# Patient Record
Sex: FEMALE | Race: WHITE | Employment: FULL TIME | URBAN - METROPOLITAN AREA
[De-identification: names, ages, dates, MRNs, and addresses within clinical notes are randomized per-mention and may not be internally consistent; named-entity substitution may affect disease eponyms.]

---

## 2017-04-17 ENCOUNTER — OFFICE VISIT (OUTPATIENT)
Dept: FAMILY MEDICINE CLINIC | Facility: CLINIC | Age: 52
End: 2017-04-17

## 2017-04-17 VITALS
TEMPERATURE: 97 F | DIASTOLIC BLOOD PRESSURE: 68 MMHG | WEIGHT: 131 LBS | BODY MASS INDEX: 22.09 KG/M2 | SYSTOLIC BLOOD PRESSURE: 106 MMHG | HEART RATE: 76 BPM | HEIGHT: 64.5 IN | RESPIRATION RATE: 16 BRPM

## 2017-04-17 DIAGNOSIS — G47.00 INSOMNIA, UNSPECIFIED TYPE: ICD-10-CM

## 2017-04-17 DIAGNOSIS — Z00.00 ROUTINE PHYSICAL EXAMINATION: Primary | ICD-10-CM

## 2017-04-17 DIAGNOSIS — Z12.39 SCREENING FOR BREAST CANCER: ICD-10-CM

## 2017-04-17 DIAGNOSIS — Z01.419 ENCOUNTER FOR GYNECOLOGICAL EXAMINATION WITHOUT ABNORMAL FINDING: ICD-10-CM

## 2017-04-17 PROCEDURE — 99396 PREV VISIT EST AGE 40-64: CPT | Performed by: FAMILY MEDICINE

## 2017-04-17 RX ORDER — CLONAZEPAM 0.5 MG/1
TABLET ORAL
Qty: 30 TABLET | Refills: 1 | Status: SHIPPED
Start: 2017-04-17 | End: 2018-02-05

## 2017-04-17 RX ORDER — VALACYCLOVIR HYDROCHLORIDE 500 MG/1
500 TABLET, FILM COATED ORAL 2 TIMES DAILY
Qty: 30 TABLET | Refills: 2 | Status: SHIPPED | OUTPATIENT
Start: 2017-04-17 | End: 2018-07-17

## 2017-04-17 NOTE — PROGRESS NOTES
HPI: Connor Moore is a 47year old female who presents for routine physical.  Has not been seen here since 2014. Was seen at [facility] due to insurance change. States depression is stable. Feels anxiety is worse than depression.     Uses Clonazepam as ne Negative for eye discharge and vision loss  Gastrointestinal:  Negative for abdominal pain, constipation, decreased appetite, diarrhea and vomiting  Genitourinary:  Negative for dysuria and hematuria  Hema/Lymph:  Negative for easy bleeding and easy bruisi call with results. Insomnia, unspecified type    May be secondary to anxiety.      Derrick Christina, DO

## 2017-04-22 ENCOUNTER — APPOINTMENT (OUTPATIENT)
Dept: LAB | Age: 52
End: 2017-04-22
Attending: FAMILY MEDICINE
Payer: COMMERCIAL

## 2017-04-22 DIAGNOSIS — Z00.00 ROUTINE PHYSICAL EXAMINATION: ICD-10-CM

## 2017-04-22 PROCEDURE — 80061 LIPID PANEL: CPT

## 2017-04-22 PROCEDURE — 82306 VITAMIN D 25 HYDROXY: CPT

## 2017-04-22 PROCEDURE — 80053 COMPREHEN METABOLIC PANEL: CPT

## 2017-04-22 PROCEDURE — 84443 ASSAY THYROID STIM HORMONE: CPT

## 2017-04-22 PROCEDURE — 83036 HEMOGLOBIN GLYCOSYLATED A1C: CPT

## 2017-04-22 PROCEDURE — 36415 COLL VENOUS BLD VENIPUNCTURE: CPT

## 2017-04-22 PROCEDURE — 85027 COMPLETE CBC AUTOMATED: CPT

## 2017-05-08 ENCOUNTER — TELEPHONE (OUTPATIENT)
Dept: FAMILY MEDICINE CLINIC | Facility: CLINIC | Age: 52
End: 2017-05-08

## 2017-05-08 RX ORDER — FLUCONAZOLE 150 MG/1
150 TABLET ORAL ONCE
Qty: 2 TABLET | Refills: 0 | Status: SHIPPED | OUTPATIENT
Start: 2017-05-08 | End: 2017-05-08

## 2017-05-08 NOTE — TELEPHONE ENCOUNTER
Notified pt prescription sent to pharmacy as requested and advised pt to call back if no improvement in symptoms, pt agreed with plan of care and had no further questions at this time.

## 2017-05-08 NOTE — TELEPHONE ENCOUNTER
Per pt, she is having possible yeast infection started about a day ago Sx: itchy, redness and a bit of a discharge, per pt she has a long history of it and would like to know if  can send rx med Diflucan to her pharmacy on file.

## 2017-05-08 NOTE — TELEPHONE ENCOUNTER
Actions Requested: pt thinks she has a yeast infection, asking for prescription for Diflucan  Situation/Background   Problem: vaginal itching redness, white vaginal discharge   Onset: yesterday   Associated Symptoms: vaginal itching, irritation, redness

## 2017-08-17 ENCOUNTER — HOSPITAL ENCOUNTER (OUTPATIENT)
Dept: MAMMOGRAPHY | Facility: HOSPITAL | Age: 52
Discharge: HOME | End: 2017-08-17
Attending: FAMILY MEDICINE
Payer: COMMERCIAL

## 2017-08-17 DIAGNOSIS — Z12.39 SCREENING FOR BREAST CANCER: ICD-10-CM

## 2017-09-20 ENCOUNTER — HOSPITAL ENCOUNTER (OUTPATIENT)
Dept: MAMMOGRAPHY | Facility: HOSPITAL | Age: 52
Discharge: HOME | End: 2017-09-20
Attending: FAMILY MEDICINE
Payer: COMMERCIAL

## 2017-09-20 PROCEDURE — 77067 SCR MAMMO BI INCL CAD: CPT | Performed by: FAMILY MEDICINE

## 2017-10-21 ENCOUNTER — TELEPHONE (OUTPATIENT)
Dept: OTHER | Age: 52
End: 2017-10-21

## 2017-10-21 NOTE — TELEPHONE ENCOUNTER
Pt calling to set up a appt to see Dr.Weiler as she has been having more hot flashes making her anxiety worse. Pt stated that she has been having hot flashes for about two years on/off but feels that they are coming on more and more.  She also has been havi

## 2017-10-25 ENCOUNTER — OFFICE VISIT (OUTPATIENT)
Dept: FAMILY MEDICINE CLINIC | Facility: CLINIC | Age: 52
End: 2017-10-25

## 2017-10-25 VITALS
SYSTOLIC BLOOD PRESSURE: 111 MMHG | BODY MASS INDEX: 22 KG/M2 | TEMPERATURE: 98 F | WEIGHT: 132 LBS | HEART RATE: 67 BPM | DIASTOLIC BLOOD PRESSURE: 71 MMHG | RESPIRATION RATE: 16 BRPM

## 2017-10-25 DIAGNOSIS — R23.2 HOT FLASHES: Primary | ICD-10-CM

## 2017-10-25 DIAGNOSIS — F41.9 ANXIETY: ICD-10-CM

## 2017-10-25 DIAGNOSIS — M21.619 BUNION OF GREAT TOE: ICD-10-CM

## 2017-10-25 PROCEDURE — 99213 OFFICE O/P EST LOW 20 MIN: CPT | Performed by: FAMILY MEDICINE

## 2017-10-25 PROCEDURE — 99212 OFFICE O/P EST SF 10 MIN: CPT | Performed by: FAMILY MEDICINE

## 2017-10-25 NOTE — PROGRESS NOTES
HPI: Iglesia Camarena is a 47year old female who presents for menopausal symptoms. Hot flashes have come back in the past few weeks which have triggered anxiety. Has a history of depression and anxiety. Has not been on SSRI in greater than 10 years.   Has had therap right now but will monitor closely. Bunion of great toe    Refer to podiatry. I spent approximately 15 minutes with this patient face to face, >50% was in counseling.         Asa Betancur, DO

## 2017-11-08 ENCOUNTER — NURSE TRIAGE (OUTPATIENT)
Dept: OTHER | Age: 52
End: 2017-11-08

## 2017-11-08 ENCOUNTER — APPOINTMENT (OUTPATIENT)
Dept: GENERAL RADIOLOGY | Facility: HOSPITAL | Age: 52
End: 2017-11-08
Payer: COMMERCIAL

## 2017-11-08 ENCOUNTER — HOSPITAL ENCOUNTER (OUTPATIENT)
Facility: HOSPITAL | Age: 52
Setting detail: OBSERVATION
Discharge: HOME | End: 2017-11-09
Admitting: HOSPITALIST
Payer: COMMERCIAL

## 2017-11-08 DIAGNOSIS — R07.9 ACUTE CHEST PAIN: Primary | ICD-10-CM

## 2017-11-08 PROBLEM — E87.1 HYPONATREMIA: Status: ACTIVE | Noted: 2017-11-08

## 2017-11-08 PROCEDURE — 71020 XR CHEST PA + LAT CHEST (CPT=71020): CPT

## 2017-11-08 PROCEDURE — 99219 INITIAL OBSERVATION CARE,LEVL II: CPT | Performed by: HOSPITALIST

## 2017-11-08 RX ORDER — METOPROLOL TARTRATE 50 MG/1
50 TABLET, FILM COATED ORAL ONCE AS NEEDED
Status: DISCONTINUED | OUTPATIENT
Start: 2017-11-08 | End: 2017-11-08

## 2017-11-08 RX ORDER — ALPRAZOLAM 0.25 MG/1
0.25 TABLET ORAL
Status: DISCONTINUED | OUTPATIENT
Start: 2017-11-09 | End: 2017-11-09

## 2017-11-08 RX ORDER — ALPRAZOLAM 0.5 MG/1
0.25 TABLET ORAL
Status: DISCONTINUED | OUTPATIENT
Start: 2017-11-08 | End: 2017-11-08

## 2017-11-08 RX ORDER — NITROGLYCERIN 0.4 MG/1
0.4 TABLET SUBLINGUAL ONCE
Status: DISCONTINUED | OUTPATIENT
Start: 2017-11-09 | End: 2017-11-09

## 2017-11-08 RX ORDER — METOPROLOL TARTRATE 5 MG/5ML
5 INJECTION INTRAVENOUS SEE ADMIN INSTRUCTIONS
Status: DISCONTINUED | OUTPATIENT
Start: 2017-11-08 | End: 2017-11-08

## 2017-11-08 RX ORDER — ONDANSETRON 2 MG/ML
4 INJECTION INTRAMUSCULAR; INTRAVENOUS EVERY 6 HOURS PRN
Status: DISCONTINUED | OUTPATIENT
Start: 2017-11-08 | End: 2017-11-09

## 2017-11-08 RX ORDER — DILTIAZEM HYDROCHLORIDE 5 MG/ML
10 INJECTION INTRAVENOUS SEE ADMIN INSTRUCTIONS
Status: DISCONTINUED | OUTPATIENT
Start: 2017-11-08 | End: 2017-11-08

## 2017-11-08 RX ORDER — ACETAMINOPHEN 325 MG/1
650 TABLET ORAL EVERY 6 HOURS PRN
Status: DISCONTINUED | OUTPATIENT
Start: 2017-11-08 | End: 2017-11-09

## 2017-11-08 RX ORDER — METOPROLOL TARTRATE 50 MG/1
50 TABLET, FILM COATED ORAL ONCE AS NEEDED
Status: COMPLETED | OUTPATIENT
Start: 2017-11-09 | End: 2017-11-09

## 2017-11-08 RX ORDER — ZOLPIDEM TARTRATE 5 MG/1
5 TABLET ORAL NIGHTLY PRN
Status: DISCONTINUED | OUTPATIENT
Start: 2017-11-08 | End: 2017-11-09

## 2017-11-08 RX ORDER — ASPIRIN 325 MG
325 TABLET ORAL DAILY
Status: DISCONTINUED | OUTPATIENT
Start: 2017-11-09 | End: 2017-11-09

## 2017-11-08 RX ORDER — METOPROLOL TARTRATE 50 MG/1
100 TABLET, FILM COATED ORAL ONCE AS NEEDED
Status: DISCONTINUED | OUTPATIENT
Start: 2017-11-08 | End: 2017-11-08

## 2017-11-08 RX ORDER — METOPROLOL TARTRATE 50 MG/1
50 TABLET, FILM COATED ORAL ONCE
Status: DISCONTINUED | OUTPATIENT
Start: 2017-11-08 | End: 2017-11-09

## 2017-11-08 RX ORDER — METOPROLOL TARTRATE 100 MG/1
100 TABLET ORAL ONCE AS NEEDED
Status: DISCONTINUED | OUTPATIENT
Start: 2017-11-09 | End: 2017-11-09

## 2017-11-08 RX ORDER — CHROMIUM 200 MCG
1 TABLET ORAL DAILY
Status: DISCONTINUED | OUTPATIENT
Start: 2017-11-09 | End: 2017-11-08 | Stop reason: RX

## 2017-11-08 RX ORDER — METOPROLOL TARTRATE 100 MG/1
100 TABLET ORAL ONCE
Status: COMPLETED | OUTPATIENT
Start: 2017-11-08 | End: 2017-11-08

## 2017-11-08 RX ORDER — CLONAZEPAM 0.5 MG/1
0.5 TABLET ORAL NIGHTLY PRN
Status: DISCONTINUED | OUTPATIENT
Start: 2017-11-08 | End: 2017-11-09

## 2017-11-08 RX ORDER — NITROGLYCERIN 0.4 MG/1
0.4 TABLET SUBLINGUAL EVERY 5 MIN PRN
Status: DISCONTINUED | OUTPATIENT
Start: 2017-11-08 | End: 2017-11-09

## 2017-11-08 RX ORDER — METOPROLOL TARTRATE 5 MG/5ML
5 INJECTION INTRAVENOUS SEE ADMIN INSTRUCTIONS
Status: DISCONTINUED | OUTPATIENT
Start: 2017-11-09 | End: 2017-11-09

## 2017-11-08 RX ORDER — SODIUM CHLORIDE 0.9 % (FLUSH) 0.9 %
3 SYRINGE (ML) INJECTION AS NEEDED
Status: DISCONTINUED | OUTPATIENT
Start: 2017-11-08 | End: 2017-11-09

## 2017-11-08 RX ORDER — ASPIRIN 81 MG/1
324 TABLET, CHEWABLE ORAL ONCE
Status: COMPLETED | OUTPATIENT
Start: 2017-11-08 | End: 2017-11-08

## 2017-11-08 RX ORDER — OMEGA-3 FATTY ACIDS/FISH OIL 300-1000MG
1 CAPSULE ORAL DAILY
Status: DISCONTINUED | OUTPATIENT
Start: 2017-11-09 | End: 2017-11-08 | Stop reason: RX

## 2017-11-08 RX ORDER — DILTIAZEM HYDROCHLORIDE 5 MG/ML
10 INJECTION INTRAVENOUS SEE ADMIN INSTRUCTIONS
Status: DISCONTINUED | OUTPATIENT
Start: 2017-11-09 | End: 2017-11-09

## 2017-11-08 NOTE — TELEPHONE ENCOUNTER
Action Requested: Summary for Provider     []  Critical Lab, Recommendations Needed  [] Need Additional Advice  []   FYI    []   Need Orders  [] Need Medications Sent to Pharmacy  []  Other     SUMMARY: Pt was advised to go immediately to ER, she declined

## 2017-11-08 NOTE — ED PROVIDER NOTES
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     Physical Exam   ED Triage Vitals [11/08/17 1335]  BP: 134/92  Pulse: 69  Resp: 20  Temp: 98.8 °F (37.1 °C)  Temp src: Temporal  SpO2: 100 %  O2 Device: None (Room air)    Current:/79   Pulse 85   Temp 98.8 °F (3 RAINBOW DRAW LAVENDER TALL (BNP)     EKG    Rate, intervals and axes as noted on EKG Report. Rate: 65  Rhythm: Sinus Rhythm  Reading: Patient's EKG demonstrates a sinus rhythm with a rate of 65.   Patient's axis and intervals are normal.  There is no acu

## 2017-11-08 NOTE — H&P
[facility]    PATIENT'S NAME: Suraj Yolis OBRIEN   ATTENDING PHYSICIAN: Brent Lujan MD   PATIENT ACCOUNT#:   [id number]    LOCATION:  [place] [address]  MEDICAL RECORD #:   [id number]       YOB: 1965  ADMISSION DATE:       11/08/201 pressure 146/104, pulse ox 99% on room air. HEENT:  Atraumatic. Oropharynx clear with moist mucous membranes. Normal hard and soft palate. NECK:  Trachea midline. Full range of motion. Supple. No thyromegaly or lymphadenopathy.   LUNGS:  Clear to ausc

## 2017-11-08 NOTE — ED INITIAL ASSESSMENT (HPI)
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 \" No distress.  Breathing normal.

## 2017-11-09 ENCOUNTER — APPOINTMENT (OUTPATIENT)
Dept: CT IMAGING | Facility: HOSPITAL | Age: 52
End: 2017-11-09
Attending: HOSPITALIST
Payer: COMMERCIAL

## 2017-11-09 VITALS
RESPIRATION RATE: 20 BRPM | OXYGEN SATURATION: 100 % | HEART RATE: 73 BPM | DIASTOLIC BLOOD PRESSURE: 64 MMHG | HEIGHT: 65 IN | BODY MASS INDEX: 22.36 KG/M2 | WEIGHT: 134.19 LBS | SYSTOLIC BLOOD PRESSURE: 101 MMHG | TEMPERATURE: 99 F

## 2017-11-09 PROCEDURE — 75574 CT ANGIO HRT W/3D IMAGE: CPT | Performed by: HOSPITALIST

## 2017-11-09 PROCEDURE — 99217 OBSERVATION CARE DISCHARGE: CPT | Performed by: HOSPITALIST

## 2017-11-09 PROCEDURE — 99291 CRITICAL CARE FIRST HOUR: CPT | Performed by: HOSPITALIST

## 2017-11-09 RX ORDER — 0.9 % SODIUM CHLORIDE 0.9 %
VIAL (ML) INJECTION
Status: DISCONTINUED
Start: 2017-11-09 | End: 2017-11-09

## 2017-11-09 RX ORDER — SODIUM CHLORIDE 9 MG/ML
INJECTION, SOLUTION INTRAVENOUS
Status: COMPLETED
Start: 2017-11-09 | End: 2017-11-09

## 2017-11-09 NOTE — PLAN OF CARE
Patient/Family Goals    • Patient/Family Long Term Goal Adequate for Discharge    • Patient/Family Short Term Goal Adequate for Discharge        CTA NORMAL.  D/C PATIENT HOME

## 2017-11-09 NOTE — PROGRESS NOTES
CT angiogram report to follow:  LAD 1 major diag both are normal  LCx 3 Om normal  RCA dominant and normal along with PDA. PLV is too small to characterize.

## 2017-11-09 NOTE — SIGNIFICANT EVENT
RRT called to room 505    Patient lethargic, slightly confused claims she cannot see anything feeling extremely weak. As per RN currently being managed for CT angiogram coronaries received beta-blocker now heart rate in 20s with blood pressure of 60/20.

## 2017-11-09 NOTE — SIGNIFICANT EVENT
RRT    *See RRT Documentation Record*    Reason the RRT was called:  bradycardic 20's   Assessment of patient leading up to RRT: Rn was with patient on this episode, she was c/o of nausea , sweating then loss her  Consciousness.  As per tele, patients heart

## 2017-11-09 NOTE — TELEPHONE ENCOUNTER
Pt was admitted to [address] [place] [address] for hyponatremia and chest pain. Closing encounter per protocol.

## 2017-11-09 NOTE — IMAGING NOTE
TO CT VIA CART AT 1030  HX TAKEN PT CONSENTED TODAY VS HR 54 BP 91/59    18 GAUGE IV STARTED ON UNIT    RECEIVED METOPROLOL IN 2 DOSES LAST NIGHT AND 0700 TODAY.     TO CT TABLE O2 PLACED AT 2 L NASAL CANNULA HOOKED TO MONITOR      NITROGLYCERIN 0.4 MILLIGR

## 2017-11-09 NOTE — DISCHARGE SUMMARY
[facility]    Discharge Summary    Jeaneth Pyle Patient Status:  Observation    1965 MRN [id number]   Location [address] [facility] Attending Bhargav Chaudhry MD   Hosp Day # 0 PCP Emelia Runner, DO     Date of Admission: 20 not exerting herself. Came in to the emergency department for evaluation. CBC, chemistry, troponin, chest x-ray, and EKG were all unremarkable. The patient will be admitted to hospital for further management and observation.     Hospital Course:   -Chest

## 2017-11-09 NOTE — PROGRESS NOTES
Chromium tablets and Omega-3 capsules have been discontinued while patient is in the hospital due to availability. Medications can be resumed on discharge if deemed necessary.     David Jaimes, 11/08/17, 7:44 PM

## 2017-11-10 ENCOUNTER — TELEPHONE (OUTPATIENT)
Dept: INTERNAL MEDICINE UNIT | Facility: HOSPITAL | Age: 52
End: 2017-11-10

## 2018-02-05 RX ORDER — CLONAZEPAM 0.5 MG/1
TABLET ORAL
Qty: 30 TABLET | Refills: 0 | OUTPATIENT
Start: 2018-02-05 | End: 2018-02-07

## 2018-02-06 NOTE — TELEPHONE ENCOUNTER
LOV: 10-25-17 Last Rx: 4-17-17    Please advise in regards to refill request. Thank You    Please respond to pool: RICHARD FM OPO LPN/CMA

## 2018-02-09 RX ORDER — CLONAZEPAM 0.5 MG/1
TABLET ORAL
Qty: 30 TABLET | Refills: 0 | Status: SHIPPED | OUTPATIENT
Start: 2018-02-09 | End: 2018-02-14

## 2018-02-14 ENCOUNTER — TELEPHONE (OUTPATIENT)
Dept: INTERNAL MEDICINE UNIT | Facility: HOSPITAL | Age: 53
End: 2018-02-14

## 2018-02-15 RX ORDER — CLONAZEPAM 0.5 MG/1
TABLET ORAL
Qty: 30 TABLET | Refills: 0 | Status: SHIPPED | OUTPATIENT
Start: 2018-02-15 | End: 2018-03-14 | Stop reason: ALTCHOICE

## 2018-02-19 NOTE — TELEPHONE ENCOUNTER
Pt is calling state that she has been waiting sense the 2/7 pt want to know if prescription can be phone in today pleae

## 2018-03-12 ENCOUNTER — TELEPHONE (OUTPATIENT)
Dept: FAMILY MEDICINE CLINIC | Facility: CLINIC | Age: 53
End: 2018-03-12

## 2018-03-14 ENCOUNTER — TELEPHONE (OUTPATIENT)
Dept: OTHER | Age: 53
End: 2018-03-14

## 2018-03-14 NOTE — PROGRESS NOTES
HPI: Cristal Fabian is a 47year old female who presents for increasing problems with anxiety and depression. Started seeing a therapist. Therapist is recommending medication. Had been on SSRI for about 10 years but has been off for well over 10 years.  No suicidal disorder     Will start Wellbutrin as pt tolerated it well in the past. Discussed possible side effects. Follow-up one month. Bunion of great toe of left foot    Refer to Podiatry. Pharyngitis, unspecified etiology    Rapid strep negative.   Symptomati

## 2018-03-15 NOTE — TELEPHONE ENCOUNTER
Spoke with Bridget from [facility] pharmacy-calling to clarify Clonazepam Rx sent today-she states, \"There's a big black line going through to fax. \". Phoned in Clonazepam refill to [address] at [facility] as ordered By CMW today.

## 2018-03-29 ENCOUNTER — PATIENT MESSAGE (OUTPATIENT)
Dept: FAMILY MEDICINE CLINIC | Facility: CLINIC | Age: 53
End: 2018-03-29

## 2018-03-29 RX ORDER — ESTRADIOL 10 UG/1
10 INSERT VAGINAL
Qty: 8 TABLET | Refills: 2 | Status: SHIPPED | OUTPATIENT
Start: 2018-03-29 | End: 2018-04-28

## 2018-04-02 NOTE — TELEPHONE ENCOUNTER
From: Marino Riley DO  To: Stephanie Pisano  Sent: 3/29/2018 5:55 PM CDT  Subject: medication    HI Karlos Lash-   While trying to clean my desk, I came across your name and realized I never sent in the cream for vaginal dryness.  It's called Vagifem and you c

## 2018-04-24 NOTE — TELEPHONE ENCOUNTER
Please advise on refill request.     Refill Protocol Appointment Criteria  · Appointment scheduled in the past 6 months or in the next 3 months  Recent Outpatient Visits            1 month ago Moderate episode of recurrent major depressive disorder ([facility] [address]

## 2018-04-25 RX ORDER — CLONAZEPAM 0.5 MG/1
TABLET ORAL
Qty: 30 TABLET | Refills: 0 | OUTPATIENT
Start: 2018-04-25 | End: 2018-04-25

## 2018-04-30 RX ORDER — CLONAZEPAM 0.5 MG/1
TABLET ORAL
Qty: 30 TABLET | Refills: 1 | Status: SHIPPED
Start: 2018-04-30 | End: 2018-10-02

## 2018-05-01 ENCOUNTER — OFFICE VISIT (OUTPATIENT)
Dept: PODIATRY CLINIC | Facility: CLINIC | Age: 53
End: 2018-05-01

## 2018-05-01 ENCOUNTER — HOSPITAL ENCOUNTER (OUTPATIENT)
Dept: GENERAL RADIOLOGY | Facility: HOSPITAL | Age: 53
Discharge: HOME | End: 2018-05-01
Attending: PODIATRIST
Payer: COMMERCIAL

## 2018-05-01 DIAGNOSIS — M21.612 BILATERAL BUNIONS: Primary | ICD-10-CM

## 2018-05-01 DIAGNOSIS — M21.611 BILATERAL BUNIONS: Primary | ICD-10-CM

## 2018-05-01 DIAGNOSIS — M79.671 PAIN IN BOTH FEET: ICD-10-CM

## 2018-05-01 DIAGNOSIS — M79.672 PAIN IN BOTH FEET: ICD-10-CM

## 2018-05-01 DIAGNOSIS — M21.612 BILATERAL BUNIONS: ICD-10-CM

## 2018-05-01 DIAGNOSIS — M21.611 BILATERAL BUNIONS: ICD-10-CM

## 2018-05-01 PROCEDURE — 73630 X-RAY EXAM OF FOOT: CPT | Performed by: PODIATRIST

## 2018-05-01 PROCEDURE — 99212 OFFICE O/P EST SF 10 MIN: CPT | Performed by: PODIATRIST

## 2018-05-01 PROCEDURE — 99243 OFF/OP CNSLTJ NEW/EST LOW 30: CPT | Performed by: PODIATRIST

## 2018-07-18 RX ORDER — VALACYCLOVIR HYDROCHLORIDE 500 MG/1
TABLET, FILM COATED ORAL
Qty: 30 TABLET | Refills: 1 | Status: SHIPPED | OUTPATIENT
Start: 2018-07-18 | End: 2018-08-12

## 2018-08-12 NOTE — TELEPHONE ENCOUNTER
LOV: 3-14-18 Last Rx: 7-18-18     No protocol     Please advise in regards to refill request. Thank You

## 2018-08-13 RX ORDER — VALACYCLOVIR HYDROCHLORIDE 500 MG/1
TABLET, FILM COATED ORAL
Qty: 30 TABLET | Refills: 0 | Status: SHIPPED | OUTPATIENT
Start: 2018-08-13 | End: 2018-12-20

## 2018-08-27 ENCOUNTER — TELEPHONE (OUTPATIENT)
Dept: FAMILY MEDICINE CLINIC | Facility: CLINIC | Age: 53
End: 2018-08-27

## 2018-08-27 DIAGNOSIS — Z12.39 SCREENING FOR BREAST CANCER: Primary | ICD-10-CM

## 2018-08-27 DIAGNOSIS — R92.2 DENSE BREAST TISSUE ON MAMMOGRAM: ICD-10-CM

## 2018-08-27 NOTE — TELEPHONE ENCOUNTER
Pt called in requesting to have a [facility] diagnostic mammogram order placed on to her chart.   Please advise

## 2018-08-28 NOTE — TELEPHONE ENCOUNTER
Dr Jamshid Coburn order pending. Last px 4/17/17, last mammo last September.  Here are the recommendations: RECOMMENDATIONS:   ROUTINE SCREENING MAMMOGRAM TO PERHAPS INCLUDE BREAST  TOMOSYNTHESIS  AND CLINICAL EVALUATION

## 2018-10-02 RX ORDER — CLONAZEPAM 0.5 MG/1
TABLET ORAL
Qty: 30 TABLET | Refills: 0 | Status: SHIPPED
Start: 2018-10-02 | End: 2018-12-04

## 2018-10-02 NOTE — TELEPHONE ENCOUNTER
Pt is requesting a refill on :    Per pt she needs a new script. She states she is leaving out of town.      CLONAZEPAM 0.5 MG Oral Tab TAKE 1 TABLET BY MOUTH AT BEDTIME Disp: 30 tablet Rfl: 1

## 2018-10-30 ENCOUNTER — HOSPITAL ENCOUNTER (OUTPATIENT)
Dept: MAMMOGRAPHY | Facility: HOSPITAL | Age: 53
Discharge: HOME | End: 2018-10-30
Attending: FAMILY MEDICINE
Payer: COMMERCIAL

## 2018-10-30 DIAGNOSIS — Z12.39 SCREENING FOR BREAST CANCER: ICD-10-CM

## 2018-10-30 DIAGNOSIS — R92.2 DENSE BREAST TISSUE ON MAMMOGRAM: ICD-10-CM

## 2018-10-30 PROCEDURE — 77067 SCR MAMMO BI INCL CAD: CPT | Performed by: FAMILY MEDICINE

## 2018-10-30 PROCEDURE — 77063 BREAST TOMOSYNTHESIS BI: CPT | Performed by: FAMILY MEDICINE

## 2018-12-04 NOTE — TELEPHONE ENCOUNTER
Review pended refill request as it does not fall under a protocol.     Last Rx: 10/2/18  LOV: 03/14/18  Refill Protocol Appointment Criteria  · Appointment scheduled in the past 6 months or in the next 3 months  Recent Outpatient Visits            7 months

## 2018-12-05 RX ORDER — VALACYCLOVIR HYDROCHLORIDE 500 MG/1
TABLET, FILM COATED ORAL
Qty: 30 TABLET | Refills: 0 | Status: SHIPPED | OUTPATIENT
Start: 2018-12-05 | End: 2018-12-20

## 2018-12-05 NOTE — TELEPHONE ENCOUNTER
No Protocol on this med.        Requested Prescriptions     Pending Prescriptions Disp Refills   • VALACYCLOVIR  MG Oral Tab [Pharmacy Med Name: ValACYclovir HCl Oral Tablet 500 MG] 30 tablet 0     Sig: TAKE ONE TABLET BY MOUTH TWICE DAILY FOR 3 DAYS

## 2018-12-06 RX ORDER — CLONAZEPAM 0.5 MG/1
TABLET ORAL
Qty: 30 TABLET | Refills: 1 | Status: SHIPPED
Start: 2018-12-06 | End: 2018-12-07

## 2018-12-08 NOTE — TELEPHONE ENCOUNTER
Requested Prescriptions     Pending Prescriptions Disp Refills   • CLONAZEPAM 0.5 MG Oral Tab [Pharmacy Med Name: ClonazePAM Oral Tablet 0.5 MG] 30 tablet 0     Sig: TAKE ONE TABLET BY MOUTH AT BEDTIME       Last Office Visit with PCP: Visit date not found

## 2018-12-10 ENCOUNTER — TELEPHONE (OUTPATIENT)
Dept: FAMILY MEDICINE CLINIC | Facility: CLINIC | Age: 53
End: 2018-12-10

## 2018-12-10 RX ORDER — CLONAZEPAM 0.5 MG/1
TABLET ORAL
Qty: 30 TABLET | Refills: 0 | Status: SHIPPED
Start: 2018-12-10 | End: 2019-01-31

## 2018-12-10 NOTE — TELEPHONE ENCOUNTER
Pt called in to f/u on medication refill request for the following medication. Pt stated she contacted pharmacy and it ha not been sent yet. Pharmacy has been verified as [facility].   Please advise pt       Current Outpatient Medications:

## 2018-12-20 ENCOUNTER — OFFICE VISIT (OUTPATIENT)
Dept: FAMILY MEDICINE CLINIC | Facility: CLINIC | Age: 53
End: 2018-12-20
Payer: COMMERCIAL

## 2018-12-20 VITALS
TEMPERATURE: 98 F | DIASTOLIC BLOOD PRESSURE: 77 MMHG | BODY MASS INDEX: 22 KG/M2 | RESPIRATION RATE: 16 BRPM | HEART RATE: 71 BPM | SYSTOLIC BLOOD PRESSURE: 114 MMHG | WEIGHT: 132 LBS

## 2018-12-20 DIAGNOSIS — N94.10 PAIN IN FEMALE GENITALIA ON INTERCOURSE: ICD-10-CM

## 2018-12-20 DIAGNOSIS — J01.10 ACUTE NON-RECURRENT FRONTAL SINUSITIS: ICD-10-CM

## 2018-12-20 DIAGNOSIS — F32.A DEPRESSION, UNSPECIFIED DEPRESSION TYPE: Primary | ICD-10-CM

## 2018-12-20 PROCEDURE — 99212 OFFICE O/P EST SF 10 MIN: CPT | Performed by: FAMILY MEDICINE

## 2018-12-20 PROCEDURE — 99214 OFFICE O/P EST MOD 30 MIN: CPT | Performed by: FAMILY MEDICINE

## 2018-12-20 RX ORDER — BUPROPION HYDROCHLORIDE 150 MG/1
150 TABLET ORAL DAILY
Qty: 90 TABLET | Refills: 1 | Status: SHIPPED | OUTPATIENT
Start: 2018-12-20 | End: 2019-06-01

## 2018-12-20 RX ORDER — VALACYCLOVIR HYDROCHLORIDE 500 MG/1
TABLET, FILM COATED ORAL
Qty: 30 TABLET | Refills: 6 | Status: SHIPPED | OUTPATIENT
Start: 2018-12-20 | End: 2019-05-03

## 2018-12-20 NOTE — PROGRESS NOTES
HPI: Silvia Wallace is a 47year old female who presents for multiple concerns. Pt would like to start Wellbutrin again. She always finds adjustment hard but she is on vacation so it's a good time to start.  Will be starting with new therapist on Jan 10th in [address] [place] [address] Neck supple. Good ROM. No LAD. CV:  Regular rate and rhythm; no murmurs  Lungs:  Clear to ausculation; good aeration               No wheezes, rales or rhonchi  Psych: Orientated to person, place, and time. Behavior and affect appropriate for age.

## 2019-01-05 ENCOUNTER — OFFICE VISIT (OUTPATIENT)
Dept: FAMILY MEDICINE CLINIC | Facility: CLINIC | Age: 54
End: 2019-01-05
Payer: COMMERCIAL

## 2019-01-05 VITALS
HEART RATE: 68 BPM | TEMPERATURE: 98 F | SYSTOLIC BLOOD PRESSURE: 120 MMHG | RESPIRATION RATE: 14 BRPM | DIASTOLIC BLOOD PRESSURE: 70 MMHG

## 2019-01-05 DIAGNOSIS — H00.024 HORDEOLUM INTERNUM LEFT UPPER EYELID: Primary | ICD-10-CM

## 2019-01-05 PROCEDURE — 99213 OFFICE O/P EST LOW 20 MIN: CPT | Performed by: NURSE PRACTITIONER

## 2019-01-05 RX ORDER — ERYTHROMYCIN 5 MG/G
OINTMENT OPHTHALMIC
Qty: 1 TUBE | Refills: 0 | Status: SHIPPED | OUTPATIENT
Start: 2019-01-05 | End: 2019-05-03

## 2019-01-05 NOTE — PROGRESS NOTES
CHIEF COMPLAINT:   Patient presents with:  Derm Problem: has swelling and pain in left upper eyelid, since yesterday. HPI:   Cheri Fernández is a 47year old female who presents with chief complaint of bump on left upper  eyelid.  Symptoms began 2 days a • Hypertension Other         per [employer]:  \"Family h/o Hypertension\"   • Cancer Maternal Aunt         per [employer]:  \"Cancer - breast, (cause of death)\"   • Breast Cancer Maternal Aunt 43        cause of death   • Cancer Paternal Grandmother         per Cornelio Diamond is a 47year old female who presents with:left upper eyelid stye, educated on using disposable warm not hot packs, eyelid hygiene and not wearing contact lenses until medication has been completed, disposing of eye make up if worn, and how to · Artificial tears may also be used to lubricate the eye and make it more comfortable. You can buy these over the counter without a prescription. Talk with your healthcare provider before using any over-the-counter treatment for a sty.   · Apply a warm, dam

## 2019-01-05 NOTE — PATIENT INSTRUCTIONS
Apply prescription ointment as directed  Apply warm, moist compress for 15 minutes throughout the day  Cleanse eyelids daily with a neutral soap (Bob's Baby Shampoo) in a 1:1 solution with clean water  If no improvement seek ophthalmologist      Torrey loza (38°C) or above, or as directed by your provider  · Vision changes  · Headache or stiff neck  · The sty comes back  Date Last Reviewed: 8/1/2017  © 6723-4487 [employer]. [address] [facility], [address] [place] [address]. All rights reserved.  This in

## 2019-02-01 NOTE — TELEPHONE ENCOUNTER
Controlled medication pending for review. If approved needs to be called in or faxed by on-site staff.     Last Rx: 12-10-18  LOV: 12-20-18

## 2019-02-02 RX ORDER — CLONAZEPAM 0.5 MG/1
0.5 TABLET ORAL NIGHTLY PRN
Qty: 30 TABLET | Refills: 0 | Status: SHIPPED
Start: 2019-02-02 | End: 2019-05-16

## 2019-05-03 ENCOUNTER — OFFICE VISIT (OUTPATIENT)
Dept: FAMILY MEDICINE CLINIC | Facility: CLINIC | Age: 54
End: 2019-05-03
Payer: COMMERCIAL

## 2019-05-03 ENCOUNTER — TELEPHONE (OUTPATIENT)
Dept: FAMILY MEDICINE CLINIC | Facility: CLINIC | Age: 54
End: 2019-05-03

## 2019-05-03 ENCOUNTER — HOSPITAL ENCOUNTER (OUTPATIENT)
Dept: CT IMAGING | Facility: HOSPITAL | Age: 54
Discharge: HOME | End: 2019-05-03
Attending: PHYSICIAN ASSISTANT
Payer: COMMERCIAL

## 2019-05-03 ENCOUNTER — NURSE TRIAGE (OUTPATIENT)
Dept: OTHER | Age: 54
End: 2019-05-03

## 2019-05-03 VITALS
TEMPERATURE: 98 F | SYSTOLIC BLOOD PRESSURE: 120 MMHG | WEIGHT: 130 LBS | DIASTOLIC BLOOD PRESSURE: 79 MMHG | HEIGHT: 65 IN | BODY MASS INDEX: 21.66 KG/M2 | HEART RATE: 76 BPM

## 2019-05-03 DIAGNOSIS — R10.32 LEFT LOWER QUADRANT PAIN: ICD-10-CM

## 2019-05-03 DIAGNOSIS — R10.32 ACUTE LEFT LOWER QUADRANT PAIN: ICD-10-CM

## 2019-05-03 DIAGNOSIS — R10.32 LEFT LOWER QUADRANT PAIN: Primary | ICD-10-CM

## 2019-05-03 DIAGNOSIS — R39.89 URINARY PROBLEM: ICD-10-CM

## 2019-05-03 DIAGNOSIS — R07.9 ACUTE CHEST PAIN: ICD-10-CM

## 2019-05-03 DIAGNOSIS — E87.1 HYPONATREMIA: ICD-10-CM

## 2019-05-03 PROCEDURE — 74177 CT ABD & PELVIS W/CONTRAST: CPT | Performed by: PHYSICIAN ASSISTANT

## 2019-05-03 PROCEDURE — 99213 OFFICE O/P EST LOW 20 MIN: CPT | Performed by: PHYSICIAN ASSISTANT

## 2019-05-03 PROCEDURE — 99212 OFFICE O/P EST SF 10 MIN: CPT | Performed by: PHYSICIAN ASSISTANT

## 2019-05-03 PROCEDURE — 82565 ASSAY OF CREATININE: CPT

## 2019-05-03 NOTE — TELEPHONE ENCOUNTER
Action Requested: Summary for Provider     []  Critical Lab, Recommendations Needed  [] Need Additional Advice  []   FYI    []   Need Orders  [] Need Medications Sent to Pharmacy  []  Other     SUMMARY: Patient requesting appt tody for intermittent lower a

## 2019-05-03 NOTE — TELEPHONE ENCOUNTER
CT  Abdomen + pelvis normal- per [facility]   Tired calling pt- no answer VL left   This is most likely a muscle pain, take advil or tylenol for pain -per [facility]

## 2019-05-03 NOTE — PROGRESS NOTES
HPI:     Abdominal Pain   This is a new problem. The current episode started 1 to 4 weeks ago. The problem occurs intermittently. The problem has been unchanged. The pain is located in the LLQ. The pain is at a severity of 6/10.  The abdominal pain does not per [employer]:  \"Family h/o Hypertension\"   • Cancer Maternal Aunt         per [employer]:  \"Cancer - breast, (cause of death)\"   • Breast Cancer Maternal Aunt 43        cause of death   • Cancer Paternal Grandmother         per [employer]:  \"Cancer - ov Blood Transfusions: Not Asked        Caffeine Concern: Yes          Coffee, 2 cups daily        Occupational Exposure: Not Asked        Hobby Hazards: Not Asked        Sleep Concern: Not Asked        Stress Concern: Not Asked        Weight Concern: N Right Ear: Tympanic membrane and ear canal normal. Tympanic membrane is not erythematous. No cerumen present  Left Ear: Tympanic membrane and ear canal normal. Tympanic membrane is not erythematous.  No cerumen present  Nose: Nose normal.   Eyes: Conjunctiv

## 2019-05-06 ENCOUNTER — TELEPHONE (OUTPATIENT)
Dept: FAMILY MEDICINE CLINIC | Facility: CLINIC | Age: 54
End: 2019-05-06

## 2019-05-06 NOTE — TELEPHONE ENCOUNTER
Pt spoke with a Triage and Pt feels Triage did no answer the questions she had.     Pt is request call back from Angela Kumar from PCP office    Pt said can call anytime

## 2019-05-07 DIAGNOSIS — R10.32 LLQ PAIN: Primary | ICD-10-CM

## 2019-05-16 ENCOUNTER — OFFICE VISIT (OUTPATIENT)
Dept: FAMILY MEDICINE CLINIC | Facility: CLINIC | Age: 54
End: 2019-05-16
Payer: COMMERCIAL

## 2019-05-16 ENCOUNTER — APPOINTMENT (OUTPATIENT)
Dept: LAB | Age: 54
End: 2019-05-16
Attending: FAMILY MEDICINE
Payer: COMMERCIAL

## 2019-05-16 VITALS
HEART RATE: 70 BPM | HEIGHT: 65 IN | SYSTOLIC BLOOD PRESSURE: 129 MMHG | RESPIRATION RATE: 16 BRPM | TEMPERATURE: 98 F | WEIGHT: 131 LBS | DIASTOLIC BLOOD PRESSURE: 78 MMHG | BODY MASS INDEX: 21.83 KG/M2

## 2019-05-16 DIAGNOSIS — Z00.00 ROUTINE PHYSICAL EXAMINATION: ICD-10-CM

## 2019-05-16 DIAGNOSIS — L71.9 ROSACEA: ICD-10-CM

## 2019-05-16 DIAGNOSIS — Z12.11 SCREENING FOR COLON CANCER: ICD-10-CM

## 2019-05-16 DIAGNOSIS — Z12.39 SCREENING FOR BREAST CANCER: ICD-10-CM

## 2019-05-16 DIAGNOSIS — Z00.00 ROUTINE PHYSICAL EXAMINATION: Primary | ICD-10-CM

## 2019-05-16 DIAGNOSIS — F32.A DEPRESSION, UNSPECIFIED DEPRESSION TYPE: ICD-10-CM

## 2019-05-16 PROCEDURE — 82306 VITAMIN D 25 HYDROXY: CPT

## 2019-05-16 PROCEDURE — 36415 COLL VENOUS BLD VENIPUNCTURE: CPT

## 2019-05-16 PROCEDURE — 85027 COMPLETE CBC AUTOMATED: CPT

## 2019-05-16 PROCEDURE — 80053 COMPREHEN METABOLIC PANEL: CPT

## 2019-05-16 PROCEDURE — 84443 ASSAY THYROID STIM HORMONE: CPT

## 2019-05-16 PROCEDURE — 80061 LIPID PANEL: CPT

## 2019-05-16 PROCEDURE — 99396 PREV VISIT EST AGE 40-64: CPT | Performed by: FAMILY MEDICINE

## 2019-05-16 NOTE — PROGRESS NOTES
HPI:  49 yr old female who presents for physical. Exercises 4-5 times per week. Going on 6 day bike ride. . Struggling with depression. Started Wellbutrin in the past. Sg he is not sure it made a difference.   Feels like depression goes up and do mucous membrane moist.  Normal lips, teeth, and gums. Oropharynx normal.  Neck supple. Good ROM. No LAD.   Thyroid normal.  CV:  Regular rate and rhythm; no murmurs  Lungs:  Clear to ausculation; good aeration               No wheezes, rales or rhonchi

## 2019-05-27 ENCOUNTER — PATIENT MESSAGE (OUTPATIENT)
Dept: OTHER | Age: 54
End: 2019-05-27

## 2019-06-01 RX ORDER — BUPROPION HYDROCHLORIDE 150 MG/1
150 TABLET ORAL DAILY
Qty: 90 TABLET | Refills: 1 | Status: SHIPPED | OUTPATIENT
Start: 2019-06-01 | End: 2019-10-09

## 2019-09-24 ENCOUNTER — OFFICE VISIT (OUTPATIENT)
Dept: FAMILY MEDICINE CLINIC | Facility: CLINIC | Age: 54
End: 2019-09-24
Payer: COMMERCIAL

## 2019-09-24 VITALS
DIASTOLIC BLOOD PRESSURE: 74 MMHG | SYSTOLIC BLOOD PRESSURE: 103 MMHG | HEART RATE: 83 BPM | HEIGHT: 65 IN | TEMPERATURE: 99 F | OXYGEN SATURATION: 96 % | RESPIRATION RATE: 16 BRPM | BODY MASS INDEX: 22.33 KG/M2 | WEIGHT: 134 LBS

## 2019-09-24 DIAGNOSIS — H69.81 DYSFUNCTION OF RIGHT EUSTACHIAN TUBE: Primary | ICD-10-CM

## 2019-09-24 PROCEDURE — 99213 OFFICE O/P EST LOW 20 MIN: CPT | Performed by: NURSE PRACTITIONER

## 2019-09-24 RX ORDER — METHYLPREDNISOLONE 4 MG/1
TABLET ORAL
Qty: 1 KIT | Refills: 0 | Status: SHIPPED | OUTPATIENT
Start: 2019-09-24 | End: 2019-10-24

## 2019-09-24 NOTE — PROGRESS NOTES
Maggy Lopez is a 49year old female who presents for  right ear fullness sensation. Problem has been occurring for  3  days. Symptoms have painful since onset. No sick contact. Denies ear discharge, swelling, or pain of the ear.   Denies fever, sinus con EYES:denies blurred or double vision any other cold symptoms, no stuffy nose, no tinnitus, + right ear decreased hearing  HEENT: see HPI  LUNGS: denies shortness of breath with exertion, no cough.   GI: no nausea   NEURO: no headaches, no dizziness, no TMJ Signed Prescriptions Disp Refills   • methylPREDNISolone (MEDROL) 4 MG Oral Tablet Therapy Pack 1 kit 0     Sig: As directed. Sudafed 120 mg by mouth daily for 3 days.           Patient Instructions     Earache, No Infection (Adult)  Earaches can happen w · You may use over-the-counter medicine as directed to control pain, unless another medicine was prescribed. If you have chronic liver or kidney disease or ever had a stomach ulcer or GI bleeding, talk with your doctor before using these medicines.  Aspirin

## 2019-10-18 ENCOUNTER — OFFICE VISIT (OUTPATIENT)
Dept: FAMILY MEDICINE CLINIC | Facility: CLINIC | Age: 54
End: 2019-10-18
Payer: COMMERCIAL

## 2019-10-18 VITALS — OXYGEN SATURATION: 100 %

## 2019-10-18 DIAGNOSIS — R30.0 DYSURIA: ICD-10-CM

## 2019-10-18 DIAGNOSIS — N30.91 CYSTITIS WITH HEMATURIA: Primary | ICD-10-CM

## 2019-10-18 PROCEDURE — 99213 OFFICE O/P EST LOW 20 MIN: CPT | Performed by: NURSE PRACTITIONER

## 2019-10-18 PROCEDURE — 81003 URINALYSIS AUTO W/O SCOPE: CPT | Performed by: NURSE PRACTITIONER

## 2019-10-18 RX ORDER — NITROFURANTOIN 25; 75 MG/1; MG/1
100 CAPSULE ORAL 2 TIMES DAILY
Qty: 10 CAPSULE | Refills: 0 | Status: SHIPPED | OUTPATIENT
Start: 2019-10-18 | End: 2019-10-23

## 2019-10-18 NOTE — PROGRESS NOTES
CHIEF COMPLAINT:   Patient presents with:  Urinary Symptoms (urologic): Urgency frequency and discomfort at the end of unrine stream.       HPI:   Jeaneth Pyle is a 49year old female who presents with symptoms of UTI.  Complaining of urinary frequency, ur BP (P) 122/80   Pulse (P) 77   Temp (P) 98.6 °F (37 °C) (Oral)   Resp (P) 18   Ht (P) 65\"   Wt (P) 132 lb (59.9 kg)   LMP 03/20/2017   SpO2 100%   BMI (P) 21.97 kg/m²   GENERAL: well developed, well nourished, and in no apparent distress  CARDIO: RRR, no Leticia Caro is a 49year old female presents with UTI symptoms. U/A: abnormal for Leukocytes: Small and Blood: Moderate. Pt does have back pain which she states is normal for her.  Educated pt that back pain can sometimes be a sign of a kidney infection, Urine is normally doesn't have any bacteria in it. But bacteria can get into the urinary tract from the skin around the rectum. Or they can travel in the blood from elsewhere in the body.  Once they are in your urinary tract, they can cause infection in the · Procedures such as having a catheter inserted  · Older age  · Not emptying your bladder. This can allow bacteria a chance to grow in your urine.   · Dehydration  · Constipation  · Sex  · Use of a diaphragm for birth control   Treatment  Bladder infections · Urinate right after intercourse to flush out your bladder. · If you use birth control pills and have frequent bladder infections, discuss it with your doctor.   Follow-up care  Call your healthcare provider if all symptoms are not gone after 3 days of tr

## 2019-10-22 ENCOUNTER — NURSE TRIAGE (OUTPATIENT)
Dept: FAMILY MEDICINE CLINIC | Facility: CLINIC | Age: 54
End: 2019-10-22

## 2019-10-22 NOTE — TELEPHONE ENCOUNTER
Action Requested: Summary for Provider     []  Critical Lab, Recommendations Needed  [] Need Additional Advice  [x]   FYI    []   Need Orders  [] Need Medications Sent to Pharmacy  []  Other     SUMMARY: Patient calling with complaint of lower back pain th

## 2019-10-24 ENCOUNTER — OFFICE VISIT (OUTPATIENT)
Dept: FAMILY MEDICINE CLINIC | Facility: CLINIC | Age: 54
End: 2019-10-24
Payer: COMMERCIAL

## 2019-10-24 VITALS
TEMPERATURE: 99 F | HEART RATE: 87 BPM | WEIGHT: 133 LBS | BODY MASS INDEX: 22 KG/M2 | RESPIRATION RATE: 16 BRPM | SYSTOLIC BLOOD PRESSURE: 100 MMHG | DIASTOLIC BLOOD PRESSURE: 64 MMHG

## 2019-10-24 DIAGNOSIS — G89.29 CHRONIC RIGHT-SIDED LOW BACK PAIN WITH RIGHT-SIDED SCIATICA: Primary | ICD-10-CM

## 2019-10-24 DIAGNOSIS — M54.41 CHRONIC RIGHT-SIDED LOW BACK PAIN WITH RIGHT-SIDED SCIATICA: Primary | ICD-10-CM

## 2019-10-24 PROCEDURE — 99213 OFFICE O/P EST LOW 20 MIN: CPT | Performed by: FAMILY MEDICINE

## 2019-10-24 NOTE — PROGRESS NOTES
HPI: Sofia Ornelas is a 49year old female who presents for low back pain. Pt state she has been diagnosed with herniated disc. No imaging taken. Woke up with pain. Saw chiropractor and did PT. Has been doing PT for about 2-3 months.   Pain improved some but now

## 2019-10-26 ENCOUNTER — HOSPITAL ENCOUNTER (OUTPATIENT)
Dept: GENERAL RADIOLOGY | Age: 54
Discharge: HOME | End: 2019-10-26
Attending: FAMILY MEDICINE
Payer: COMMERCIAL

## 2019-10-26 DIAGNOSIS — M54.41 CHRONIC RIGHT-SIDED LOW BACK PAIN WITH RIGHT-SIDED SCIATICA: ICD-10-CM

## 2019-10-26 DIAGNOSIS — G89.29 CHRONIC RIGHT-SIDED LOW BACK PAIN WITH RIGHT-SIDED SCIATICA: ICD-10-CM

## 2019-10-26 PROCEDURE — 72110 X-RAY EXAM L-2 SPINE 4/>VWS: CPT | Performed by: FAMILY MEDICINE

## 2019-11-02 ENCOUNTER — HOSPITAL ENCOUNTER (OUTPATIENT)
Dept: MAMMOGRAPHY | Facility: HOSPITAL | Age: 54
Discharge: HOME | End: 2019-11-02
Attending: FAMILY MEDICINE
Payer: COMMERCIAL

## 2019-11-02 DIAGNOSIS — Z12.39 SCREENING FOR BREAST CANCER: ICD-10-CM

## 2019-11-02 PROCEDURE — 77063 BREAST TOMOSYNTHESIS BI: CPT | Performed by: FAMILY MEDICINE

## 2019-11-02 PROCEDURE — 77067 SCR MAMMO BI INCL CAD: CPT | Performed by: FAMILY MEDICINE

## 2019-11-14 ENCOUNTER — OFFICE VISIT (OUTPATIENT)
Dept: PHYSICAL THERAPY | Age: 54
End: 2019-11-14
Attending: FAMILY MEDICINE
Payer: COMMERCIAL

## 2019-11-14 DIAGNOSIS — G89.29 CHRONIC RIGHT-SIDED LOW BACK PAIN WITH RIGHT-SIDED SCIATICA: ICD-10-CM

## 2019-11-14 DIAGNOSIS — M54.41 CHRONIC RIGHT-SIDED LOW BACK PAIN WITH RIGHT-SIDED SCIATICA: ICD-10-CM

## 2019-11-14 PROCEDURE — 97110 THERAPEUTIC EXERCISES: CPT | Performed by: PHYSICAL THERAPIST

## 2019-11-14 PROCEDURE — 97161 PT EVAL LOW COMPLEX 20 MIN: CPT | Performed by: PHYSICAL THERAPIST

## 2019-11-14 NOTE — PROGRESS NOTES
BACK EVALUATION:    Referring Physician: Yolande Graham    DX Code: Chronic right-sided low back pain with right-sided sciatica (M54.41,G89.29)     PT DX: Chronic right-sided low back pain with right-sided sciatica (M54.41,G89.29)    PCP: Indio Latham, counseled to maintain / resume normal activities.   Postural Education  (Please close note by pressing F9 and then reopen by clicking on 'Edit'  before going further)   ASSESSMENT & PLAN OF CARE:     Danii Ledezma is a 49year old female referred to  Pollen [place] joint and/or soft tissue mobility  · Therapeutic exercises  · Pt. education for posture, body mechanics, and ergonomics  · Mechanical Diagnosis and Treatment  · HEP instruction                                                             Pt. was advised reg

## 2019-11-19 ENCOUNTER — OFFICE VISIT (OUTPATIENT)
Dept: PHYSICAL THERAPY | Age: 54
End: 2019-11-19
Attending: FAMILY MEDICINE
Payer: COMMERCIAL

## 2019-11-19 PROCEDURE — 97110 THERAPEUTIC EXERCISES: CPT | Performed by: PHYSICAL THERAPIST

## 2019-11-19 PROCEDURE — 97140 MANUAL THERAPY 1/> REGIONS: CPT | Performed by: PHYSICAL THERAPIST

## 2019-11-19 NOTE — PROGRESS NOTES
Dx: Chronic right-sided low back pain with right-sided sciatica (M54.41,G89.29)         Authorized # of Visits:  8         Next MD visit: none scheduled  Fall Risk: standard         Precautions: n/a           Medication Changes since last visit?: No  Augusta Worthington

## 2019-11-23 RX ORDER — BUPROPION HYDROCHLORIDE 150 MG/1
TABLET ORAL
Qty: 90 TABLET | Refills: 0 | OUTPATIENT
Start: 2019-11-23

## 2019-11-23 NOTE — TELEPHONE ENCOUNTER
Fawn message sent. Dr Ashlyn Mitchell (psychiatrist-prescriber) who order the medication. RN=call pharmacy and patient about this.

## 2019-11-24 ENCOUNTER — OFFICE VISIT (OUTPATIENT)
Dept: FAMILY MEDICINE CLINIC | Facility: CLINIC | Age: 54
End: 2019-11-24
Payer: COMMERCIAL

## 2019-11-24 VITALS
HEART RATE: 87 BPM | TEMPERATURE: 99 F | RESPIRATION RATE: 14 BRPM | SYSTOLIC BLOOD PRESSURE: 118 MMHG | DIASTOLIC BLOOD PRESSURE: 74 MMHG

## 2019-11-24 DIAGNOSIS — H61.21 IMPACTED CERUMEN OF RIGHT EAR: Primary | ICD-10-CM

## 2019-11-24 PROCEDURE — 69210 REMOVE IMPACTED EAR WAX UNI: CPT | Performed by: NURSE PRACTITIONER

## 2019-11-24 NOTE — PROGRESS NOTES
CHIEF COMPLAINT:   Patient presents with:  Ear Pain      HPI:   Tory Gonzales is a 49year old female who presents to clinic today with reports of right ear discomfort that radiates to throat, she also admits to right ear fullness, these sx has been pres Arden Pimentel is a 49year old female who presents with \"clogged ears\"    ASSESSMENT:  Impacted cerumen of right ear  (primary encounter diagnosis)    PLAN:  Successful cerumen removal. Patient tolerated well without complications.   Advised to f/u w/ ENT for bertha

## 2019-11-26 ENCOUNTER — APPOINTMENT (OUTPATIENT)
Dept: PHYSICAL THERAPY | Age: 54
End: 2019-11-26
Attending: FAMILY MEDICINE
Payer: COMMERCIAL

## 2019-12-03 ENCOUNTER — OFFICE VISIT (OUTPATIENT)
Dept: PHYSICAL THERAPY | Age: 54
End: 2019-12-03
Attending: FAMILY MEDICINE
Payer: COMMERCIAL

## 2019-12-03 PROCEDURE — 97140 MANUAL THERAPY 1/> REGIONS: CPT | Performed by: PHYSICAL THERAPIST

## 2019-12-03 PROCEDURE — 97110 THERAPEUTIC EXERCISES: CPT | Performed by: PHYSICAL THERAPIST

## 2019-12-03 NOTE — PROGRESS NOTES
Dx: Chronic right-sided low back pain with right-sided sciatica (M54.41,G89.29)         Authorized # of Visits:  8         Next MD visit: none scheduled  Fall Risk: standard         Precautions: n/a           Medication Changes since last visit?: No  [employer] activities  · Pt. will be able to perform ADLs with no pain. · Pt will be able to perform work related tasks with no pain. · Pt. will be independent with home exercise program and self management.     Plan: pt to cont current HEP, self flex/rot mob prn t

## 2019-12-04 ENCOUNTER — TELEPHONE (OUTPATIENT)
Dept: FAMILY MEDICINE CLINIC | Facility: CLINIC | Age: 54
End: 2019-12-04

## 2019-12-04 RX ORDER — BUPROPION HYDROCHLORIDE 150 MG/1
150 TABLET ORAL EVERY MORNING
Qty: 30 TABLET | Refills: 0 | Status: SHIPPED | OUTPATIENT
Start: 2019-12-04 | End: 2020-01-01

## 2019-12-04 NOTE — TELEPHONE ENCOUNTER
Patient is calling once again about a refill request on her Bupropion. Patient states that she is not seeing the psychiatrist anymore, it was only supposed to be for a short while, and was instructed by psychiatrist to receive medication from PCP.     Thank

## 2019-12-05 ENCOUNTER — APPOINTMENT (OUTPATIENT)
Dept: PHYSICAL THERAPY | Age: 54
End: 2019-12-05
Attending: FAMILY MEDICINE
Payer: COMMERCIAL

## 2019-12-10 ENCOUNTER — APPOINTMENT (OUTPATIENT)
Dept: PHYSICAL THERAPY | Age: 54
End: 2019-12-10
Attending: FAMILY MEDICINE
Payer: COMMERCIAL

## 2019-12-12 ENCOUNTER — APPOINTMENT (OUTPATIENT)
Dept: PHYSICAL THERAPY | Age: 54
End: 2019-12-12
Attending: FAMILY MEDICINE
Payer: COMMERCIAL

## 2019-12-17 ENCOUNTER — APPOINTMENT (OUTPATIENT)
Dept: PHYSICAL THERAPY | Age: 54
End: 2019-12-17
Attending: FAMILY MEDICINE
Payer: COMMERCIAL

## 2019-12-19 ENCOUNTER — APPOINTMENT (OUTPATIENT)
Dept: PHYSICAL THERAPY | Age: 54
End: 2019-12-19
Attending: FAMILY MEDICINE
Payer: COMMERCIAL

## 2020-01-01 RX ORDER — BUPROPION HYDROCHLORIDE 150 MG/1
150 TABLET ORAL EVERY MORNING
Qty: 30 TABLET | Refills: 0 | Status: SHIPPED | OUTPATIENT
Start: 2020-01-01 | End: 2020-02-07

## 2020-01-01 NOTE — TELEPHONE ENCOUNTER
Refill Protocol Appointment Criteria. REFILLED PER PROTOCOL.     · Appointment scheduled in the past 6 months or in the next 3 months  Recent Outpatient Visits            4 weeks ago     [address] Arredondo in [place], [place], DPT,

## 2020-02-07 RX ORDER — BUPROPION HYDROCHLORIDE 150 MG/1
TABLET ORAL
Qty: 90 TABLET | Refills: 1 | Status: SHIPPED | OUTPATIENT
Start: 2020-02-07 | End: 2020-08-06

## 2020-02-07 NOTE — TELEPHONE ENCOUNTER
Review pended refill request as it does not fall under a protocol.     Last Rx: 1/1/20  #30#0  LOV: 3 months ago

## 2020-03-04 ENCOUNTER — OFFICE VISIT (OUTPATIENT)
Dept: FAMILY MEDICINE CLINIC | Facility: CLINIC | Age: 55
End: 2020-03-04
Payer: COMMERCIAL

## 2020-03-04 VITALS
BODY MASS INDEX: 22 KG/M2 | DIASTOLIC BLOOD PRESSURE: 63 MMHG | RESPIRATION RATE: 16 BRPM | HEART RATE: 80 BPM | TEMPERATURE: 98 F | SYSTOLIC BLOOD PRESSURE: 101 MMHG | WEIGHT: 135 LBS

## 2020-03-04 DIAGNOSIS — M54.41 CHRONIC RIGHT-SIDED LOW BACK PAIN WITH RIGHT-SIDED SCIATICA: Primary | ICD-10-CM

## 2020-03-04 DIAGNOSIS — R79.89 T3 LOW IN SERUM: ICD-10-CM

## 2020-03-04 DIAGNOSIS — G89.29 CHRONIC RIGHT-SIDED LOW BACK PAIN WITH RIGHT-SIDED SCIATICA: Primary | ICD-10-CM

## 2020-03-04 PROCEDURE — 99213 OFFICE O/P EST LOW 20 MIN: CPT | Performed by: FAMILY MEDICINE

## 2020-03-04 NOTE — PROGRESS NOTES
HPI; Sofia Ornelas is a 48year old female who presents for lab review. Pt was seen by a Gynecologist and had hormone levels checked. She was told she had low levels of estrogen. She was also told she had hypothyroidism.  Feels very fatigued, ore than usual. Has h

## 2020-03-05 ENCOUNTER — OFFICE VISIT (OUTPATIENT)
Dept: GASTROENTEROLOGY | Facility: CLINIC | Age: 55
End: 2020-03-05
Payer: COMMERCIAL

## 2020-03-05 ENCOUNTER — TELEPHONE (OUTPATIENT)
Dept: GASTROENTEROLOGY | Facility: CLINIC | Age: 55
End: 2020-03-05

## 2020-03-05 VITALS
WEIGHT: 137 LBS | BODY MASS INDEX: 22.82 KG/M2 | HEIGHT: 65 IN | SYSTOLIC BLOOD PRESSURE: 124 MMHG | HEART RATE: 71 BPM | DIASTOLIC BLOOD PRESSURE: 79 MMHG

## 2020-03-05 DIAGNOSIS — Z12.11 COLON CANCER SCREENING: Primary | ICD-10-CM

## 2020-03-05 PROCEDURE — S0285 CNSLT BEFORE SCREEN COLONOSC: HCPCS | Performed by: INTERNAL MEDICINE

## 2020-03-05 NOTE — PROGRESS NOTES
[address] [facility] [address] Gastroenterology                                                                                                  Clinic History and Physical     Pa Anxiety    • Chronic rhinitis    • Depression    • DUB (dysfunctional uterine bleeding)    • Genital herpes       Past Surgical History:   Procedure Laterality Date   • APPENDECTOMY     • APPENDECTOMY     •      •  DELIVERY negative for jaundice   ALLERGIC/IMMUNOLOGIC:  negative for hay fever or seasonal allergies  ENDOCRINE:  negative for cold intolerance and heat intolerance  MUSCULOSKELETAL:  negative for joint effusion/severe erythema  BEHAVIOR/PSYCH:  negative for psycho infection, pain, death, as well as the risks of anesthesia and perforation all leading to prolonged hospitalization, surgical intervention, or even death. I also specifically mentioned the miss rate of colonoscopy of 5-10% in the best of all circumstances.

## 2020-03-05 NOTE — TELEPHONE ENCOUNTER
Scheduled for:  Colonoscopy [address]97[address]  Provider Name: Dr. Erlin Montague  Date:  5/15/20  Location:  [facility]  Sedation:  MAC  Time:   0909 (pt is aware to arrive at 1215)   Prep:  Suprep  Meds/Allergies Reconciled?:  Physician reviewed   Diagnosis with codes:  Colon cancer

## 2020-03-06 ENCOUNTER — APPOINTMENT (OUTPATIENT)
Dept: LAB | Age: 55
End: 2020-03-06
Attending: FAMILY MEDICINE
Payer: COMMERCIAL

## 2020-03-06 DIAGNOSIS — R79.89 T3 LOW IN SERUM: ICD-10-CM

## 2020-03-06 LAB
T3FREE SERPL-MCNC: 2.31 PG/ML (ref 2.4–4.2)
T4 FREE SERPL-MCNC: 0.8 NG/DL (ref 0.8–1.7)
THYROPEROXIDASE AB SERPL-ACNC: <28 U/ML (ref ?–60)
TSI SER-ACNC: 2.47 MIU/ML (ref 0.36–3.74)

## 2020-03-06 PROCEDURE — 36415 COLL VENOUS BLD VENIPUNCTURE: CPT

## 2020-03-06 PROCEDURE — 84481 FREE ASSAY (FT-3): CPT

## 2020-03-06 PROCEDURE — 86376 MICROSOMAL ANTIBODY EACH: CPT

## 2020-03-06 PROCEDURE — 84443 ASSAY THYROID STIM HORMONE: CPT

## 2020-03-06 PROCEDURE — 84439 ASSAY OF FREE THYROXINE: CPT

## 2020-03-12 ENCOUNTER — TELEPHONE (OUTPATIENT)
Dept: NEUROLOGY | Facility: CLINIC | Age: 55
End: 2020-03-12

## 2020-03-12 ENCOUNTER — OFFICE VISIT (OUTPATIENT)
Dept: NEUROLOGY | Facility: CLINIC | Age: 55
End: 2020-03-12
Payer: COMMERCIAL

## 2020-03-12 VITALS
WEIGHT: 135 LBS | DIASTOLIC BLOOD PRESSURE: 82 MMHG | RESPIRATION RATE: 18 BRPM | BODY MASS INDEX: 22.49 KG/M2 | HEART RATE: 84 BPM | HEIGHT: 65 IN | SYSTOLIC BLOOD PRESSURE: 116 MMHG

## 2020-03-12 DIAGNOSIS — M54.16 LUMBAR RADICULOPATHY, CHRONIC: Primary | ICD-10-CM

## 2020-03-12 PROCEDURE — 99244 OFF/OP CNSLTJ NEW/EST MOD 40: CPT | Performed by: PHYSICAL MEDICINE & REHABILITATION

## 2020-03-12 RX ORDER — METHYLPREDNISOLONE 4 MG/1
TABLET ORAL
Qty: 1 PACKAGE | Refills: 0 | Status: SHIPPED | OUTPATIENT
Start: 2020-03-12 | End: 2021-07-30 | Stop reason: ALTCHOICE

## 2020-03-12 NOTE — PROGRESS NOTES
[address] [facility]  NEW PATIENT EVALUATION    Consultation as a request of Dr. Shawanda Cárdenas    Chief Complaint: back pain.     HISTORY OF PRESENT ILLNESS:   Patient presents with:  Low Back Pain: new right handed patient c Surgical History:   Procedure Laterality Date   • APPENDECTOMY     • APPENDECTOMY     •      •  DELIVERY FOLLOWING NEURAXIAL LABOR ANALGESIA/A           CURRENT MEDICATIONS:     Current Outpatient Medications   Medication Sig D denies  Irregular Heartbeat: denies   Respiratory  Painful Breathing: denies  Wheezing: denies   Gastrointestinal  Bowel Incontinence: denies  Heartburn: denies  Abdominal Pain: denies  Blood in Stool : denies  Rectal Pain: denies   Hematology  Easy Bruisi in all dermatomes of the lower extremities  Reflexes: 2/4 at L4 and S1  Facet Loading: no specific facet pain  Straight leg raise: negative for radicular pain symptoms  Slump test: positive for pain symptoms for radicular pain symptoms      LABS:     Lab R significant improvement with PT home exercise and oral meds. I have also prescribed her a Medrol Dosepak to be started today. I recommend that she use ice and heat as much as tolerated and continue with her home exercise protocol.   I recommend that she f

## 2020-03-12 NOTE — TELEPHONE ENCOUNTER
- MRI, LUMBAR SPINE - (CPT=72148)   Called [facility] PPO @ [phone] per phone automated system prior authorization is NOT Required. for  - MRI, LUMBAR SPINE - (CPT=72148)  Reference#[id number]. Will inform patient.  Patient informed of approval. Can proceed

## 2020-03-12 NOTE — PATIENT INSTRUCTIONS
-MRI of the lumbar spine and follow up after  -Medrol dose pack to be started  -Ice/heat as much as tolerated  -Continue home exercises

## 2020-03-15 PROBLEM — E87.1 HYPONATREMIA: Status: RESOLVED | Noted: 2017-11-08 | Resolved: 2020-03-15

## 2020-03-15 PROBLEM — R10.32 ACUTE LEFT LOWER QUADRANT PAIN: Status: RESOLVED | Noted: 2019-05-03 | Resolved: 2020-03-15

## 2020-03-15 PROBLEM — N30.91 CYSTITIS WITH HEMATURIA: Status: RESOLVED | Noted: 2019-10-18 | Resolved: 2020-03-15

## 2020-03-17 ENCOUNTER — TELEPHONE (OUTPATIENT)
Dept: GASTROENTEROLOGY | Facility: CLINIC | Age: 55
End: 2020-03-17

## 2020-03-17 DIAGNOSIS — Z12.11 COLON CANCER SCREENING: Primary | ICD-10-CM

## 2020-03-17 NOTE — TELEPHONE ENCOUNTER
Patient calling to Reschedule 5/15/20 CLN - requesting 1st or 2nd week of June. Please call - unable to reach . Thank you.

## 2020-03-18 NOTE — TELEPHONE ENCOUNTER
Rescheduled for:  Colonoscopy 45378  Provider Name: Dr. Jeb Galicia  Date:    From-5/15/20  To-6/5/20  Location:  [facility]  Sedation:  MAC  Time:    From-1315   [id number] (pt is aware to arrive at [facility] )   Prep:  Suprep, sent new instructions via [employer]  Meds/Allergies

## 2020-05-28 RX ORDER — VALACYCLOVIR HYDROCHLORIDE 500 MG/1
TABLET, FILM COATED ORAL
Qty: 30 TABLET | Refills: 0 | Status: SHIPPED | OUTPATIENT
Start: 2020-05-28 | End: 2020-08-03

## 2020-05-29 ENCOUNTER — TELEPHONE (OUTPATIENT)
Dept: GASTROENTEROLOGY | Facility: CLINIC | Age: 55
End: 2020-05-29

## 2020-05-29 DIAGNOSIS — Z12.11 COLON CANCER SCREENING: Primary | ICD-10-CM

## 2020-05-29 NOTE — TELEPHONE ENCOUNTER
Patient calling to [facility] 6/5/20 CLN - patient will call back to reschedule. Thank you.

## 2020-05-29 NOTE — TELEPHONE ENCOUNTER
Cancelled for:  Colonoscopy 07929  Provider Name: Dr. Erlin Montague  Date:     6/5/20  [address] [place] [address]  SKXX:  [id number]    Prep:  Suprep  Meds/Allergies Reconciled?:  Physician reviewed   Diagnosis with codes:  Colon cancer screening Z12.11  Was patient

## 2020-06-17 ENCOUNTER — TELEPHONE (OUTPATIENT)
Dept: GASTROENTEROLOGY | Facility: CLINIC | Age: 55
End: 2020-06-17

## 2020-06-17 DIAGNOSIS — Z12.11 SCREEN FOR COLON CANCER: Primary | ICD-10-CM

## 2020-06-17 NOTE — TELEPHONE ENCOUNTER
Schedulers- Patient would like to reschedule her colonoscopy. She was originally scheduled for 6/5/2020. Please see telephone encounter from 5/29/2020. Thank you!

## 2020-06-17 NOTE — TELEPHONE ENCOUNTER
Scheduled for:  Colonoscopy 96391  Provider Name: Dr. Shayy Cook  Date:  7/14/20  Nelia Goods arrive 8am  Prep:  Suprep  Meds/Allergies Reconciled?:  Physician reviewed   Diagnosis with codes:  Colon cancer screening Z12.11  Wa

## 2020-07-09 ENCOUNTER — TELEPHONE (OUTPATIENT)
Dept: GASTROENTEROLOGY | Facility: CLINIC | Age: 55
End: 2020-07-09

## 2020-07-09 DIAGNOSIS — Z12.11 COLON CANCER SCREENING: Primary | ICD-10-CM

## 2020-07-09 NOTE — TELEPHONE ENCOUNTER
Rescheduled for:  Colonoscopy [id number]  Provider Name: Dr. Shayy Cook  Date:    From-7/14/20  To-8/4/20  Location:  [facility]  Sedation:  MAC  Time:    From-0900  To-1130 (pt is aware to arrive at 21 [phone])   Prep:  Suprep, sent new instructions via [employer]/Allergies

## 2020-07-28 ENCOUNTER — TELEPHONE (OUTPATIENT)
Dept: GASTROENTEROLOGY | Facility: CLINIC | Age: 55
End: 2020-07-28

## 2020-07-28 DIAGNOSIS — Z12.11 COLON CANCER SCREENING: Primary | ICD-10-CM

## 2020-07-28 NOTE — TELEPHONE ENCOUNTER
Rescheduled for:  Colonoscopy [address]   Provider Name: Dr. Jeniffer Bragg  Date:  8/4/20  [facility]:   [facility]  [place] [address]  ZEWR:  7240 (pt is aware to arrive at 21 [phone])   Prep:  Suprep, sent new instructions via [employer]  Meds/Allergies Reconciled?:  Physic

## 2020-08-01 ENCOUNTER — LAB ENCOUNTER (OUTPATIENT)
Dept: LAB | Facility: HOSPITAL | Age: 55
End: 2020-08-01
Attending: INTERNAL MEDICINE
Payer: COMMERCIAL

## 2020-08-01 DIAGNOSIS — Z01.818 PRE-OP TESTING: ICD-10-CM

## 2020-08-02 LAB — SARS-COV-2 RNA RESP QL NAA+PROBE: NOT DETECTED

## 2020-08-03 RX ORDER — VALACYCLOVIR HYDROCHLORIDE 500 MG/1
TABLET, FILM COATED ORAL
Qty: 30 TABLET | Refills: 0 | Status: SHIPPED | OUTPATIENT
Start: 2020-08-03 | End: 2021-04-23

## 2020-08-04 ENCOUNTER — ANESTHESIA (OUTPATIENT)
Dept: ENDOSCOPY | Facility: HOSPITAL | Age: 55
End: 2020-08-04
Payer: COMMERCIAL

## 2020-08-04 ENCOUNTER — HOSPITAL ENCOUNTER (OUTPATIENT)
Facility: HOSPITAL | Age: 55
Setting detail: HOSPITAL OUTPATIENT SURGERY
Discharge: HOME | End: 2020-08-04
Attending: INTERNAL MEDICINE | Admitting: INTERNAL MEDICINE
Payer: COMMERCIAL

## 2020-08-04 ENCOUNTER — ANESTHESIA EVENT (OUTPATIENT)
Dept: ENDOSCOPY | Facility: HOSPITAL | Age: 55
End: 2020-08-04
Payer: COMMERCIAL

## 2020-08-04 VITALS
WEIGHT: 135 LBS | BODY MASS INDEX: 22.49 KG/M2 | RESPIRATION RATE: 18 BRPM | HEART RATE: 64 BPM | HEIGHT: 65 IN | TEMPERATURE: 98 F | DIASTOLIC BLOOD PRESSURE: 76 MMHG | OXYGEN SATURATION: 99 % | SYSTOLIC BLOOD PRESSURE: 115 MMHG

## 2020-08-04 DIAGNOSIS — K64.9 HEMORRHOIDS: ICD-10-CM

## 2020-08-04 DIAGNOSIS — Z12.11 COLON CANCER SCREENING: ICD-10-CM

## 2020-08-04 DIAGNOSIS — Z01.818 PRE-OP TESTING: Primary | ICD-10-CM

## 2020-08-04 DIAGNOSIS — K63.5 COLON POLYPS: ICD-10-CM

## 2020-08-04 PROCEDURE — 45385 COLONOSCOPY W/LESION REMOVAL: CPT | Performed by: INTERNAL MEDICINE

## 2020-08-04 PROCEDURE — 0DBM8ZX EXCISION OF DESCENDING COLON, VIA NATURAL OR ARTIFICIAL OPENING ENDOSCOPIC, DIAGNOSTIC: ICD-10-PCS | Performed by: INTERNAL MEDICINE

## 2020-08-04 PROCEDURE — 0DBN8ZX EXCISION OF SIGMOID COLON, VIA NATURAL OR ARTIFICIAL OPENING ENDOSCOPIC, DIAGNOSTIC: ICD-10-PCS | Performed by: INTERNAL MEDICINE

## 2020-08-04 PROCEDURE — 0DBK8ZX EXCISION OF ASCENDING COLON, VIA NATURAL OR ARTIFICIAL OPENING ENDOSCOPIC, DIAGNOSTIC: ICD-10-PCS | Performed by: INTERNAL MEDICINE

## 2020-08-04 PROCEDURE — 45380 COLONOSCOPY AND BIOPSY: CPT | Performed by: INTERNAL MEDICINE

## 2020-08-04 RX ORDER — SODIUM CHLORIDE, SODIUM LACTATE, POTASSIUM CHLORIDE, CALCIUM CHLORIDE 600; 310; 30; 20 MG/100ML; MG/100ML; MG/100ML; MG/100ML
INJECTION, SOLUTION INTRAVENOUS CONTINUOUS
Status: DISCONTINUED | OUTPATIENT
Start: 2020-08-04 | End: 2020-08-04

## 2020-08-04 RX ORDER — NALOXONE HYDROCHLORIDE 0.4 MG/ML
80 INJECTION, SOLUTION INTRAMUSCULAR; INTRAVENOUS; SUBCUTANEOUS AS NEEDED
Status: DISCONTINUED | OUTPATIENT
Start: 2020-08-04 | End: 2020-08-04

## 2020-08-04 RX ORDER — LIDOCAINE HYDROCHLORIDE 10 MG/ML
INJECTION, SOLUTION EPIDURAL; INFILTRATION; INTRACAUDAL; PERINEURAL AS NEEDED
Status: DISCONTINUED | OUTPATIENT
Start: 2020-08-04 | End: 2020-08-04 | Stop reason: SURG

## 2020-08-04 RX ADMIN — SODIUM CHLORIDE, SODIUM LACTATE, POTASSIUM CHLORIDE, CALCIUM CHLORIDE: 600; 310; 30; 20 INJECTION, SOLUTION INTRAVENOUS at 12:15:00

## 2020-08-04 RX ADMIN — LIDOCAINE HYDROCHLORIDE 40 MG: 10 INJECTION, SOLUTION EPIDURAL; INFILTRATION; INTRACAUDAL; PERINEURAL at 11:47:00

## 2020-08-04 NOTE — H&P
History & Physical Examination    Patient Name: Shawanda Obregon  MRN: [id number]  CSN: [id number]  YOB: 1965    Diagnosis: colon cancer screening      methylPREDNISolone 4 MG Oral Tablet Therapy Pack, As directed, Disp: 1 Package, Rfl: 0  Do Friendly sarcoidosis   • Breast Cancer Maternal Cousin Female 48     Social History    Tobacco Use      Smoking status: Never Smoker      Smokeless tobacco: Never Used    Alcohol use: Yes      Frequency: 2-4 times a month      Drinks per session: 1 or 2      Commen

## 2020-08-04 NOTE — ANESTHESIA PREPROCEDURE EVALUATION
Anesthesia PreOp Note    HPI:     Patricio Crawford is a 48year old female who presents for preoperative consultation requested by: Izabella Benites MD    Date of Surgery: 8/4/2020    Procedure(s):  COLONOSCOPY  Indication: Colon cancer screening    Relevant  at age 65   • Diabetes Mother    • Hypertension Mother    • Depression Other         per [employer]:  \"Family h/o Depression\"   • Hypertension Other         per [employer]:  \"Family h/o Hypertension\"   • Cancer Maternal Aunt         per [employer]: partner: Not on file        Emotionally abused: Not on file        Physically abused: Not on file        Forced sexual activity: Not on file    Other Topics      Concerns:         Service: Not Asked        Blood Transfusions: Not Asked        Caffe attacks,  depression,     (-) seizures, CVA    GI/Hepatic/Renal - negative ROS   (+) bowel prep    Endo/Other - negative ROS   Abdominal  - normal exam               Anesthesia Plan:   ASA:  1  Plan:   MAC  Informed Consent Plan and Risks Discussed With:

## 2020-08-04 NOTE — ANESTHESIA POSTPROCEDURE EVALUATION
Patient: Maria De Jesus Gross    Procedure Summary     Date:  08/04/20 Room / Location:  [facility] ENDOSCOPY 03 / [facility] ENDOSCOPY    Anesthesia Start:  5007 Anesthesia Stop:  0347    Procedure:  COLONOSCOPY (N/A ) Diagnosis:       Colon cancer screening      (Polyps, Hem

## 2020-08-04 NOTE — OPERATIVE REPORT
[facility] Endoscopy Report      Preoperative Diagnosis:  - colon cancer screening      Postoperative Diagnosis:  - colon polyps x 3  - internal hemorrhoids      Procedure:    Colonoscopy       Surgeon:  Sara Guzman M.D.     Anesthesia:

## 2020-08-06 ENCOUNTER — TELEPHONE (OUTPATIENT)
Dept: GASTROENTEROLOGY | Facility: CLINIC | Age: 55
End: 2020-08-06

## 2020-08-06 ENCOUNTER — MED REC SCAN ONLY (OUTPATIENT)
Dept: GASTROENTEROLOGY | Facility: CLINIC | Age: 55
End: 2020-08-06

## 2020-08-06 RX ORDER — BUPROPION HYDROCHLORIDE 150 MG/1
TABLET ORAL
Qty: 90 TABLET | Refills: 0 | Status: SHIPPED | OUTPATIENT
Start: 2020-08-06 | End: 2020-11-02

## 2020-08-06 NOTE — TELEPHONE ENCOUNTER
I reviewed below result note with Connor Moore over the phone and she voiced full understanding. Health maintenance updated. Recall entered for colonoscopy in 7 years. Patient will be due for next colonoscopy 8/4/2027.

## 2020-08-06 NOTE — TELEPHONE ENCOUNTER
----- Message from Indigo Finch MD sent at 8/5/2020  5:23 PM CDT -----  I wanted to get back to you with your colonoscopy results. You had 3 colon polyps removed which were benign.   I would advise a repeat colonoscopy in 7 years to make sure no new po

## 2020-10-08 ENCOUNTER — TELEPHONE (OUTPATIENT)
Dept: FAMILY MEDICINE CLINIC | Facility: CLINIC | Age: 55
End: 2020-10-08

## 2020-10-08 ENCOUNTER — VIRTUAL PHONE E/M (OUTPATIENT)
Dept: FAMILY MEDICINE CLINIC | Facility: CLINIC | Age: 55
End: 2020-10-08
Payer: COMMERCIAL

## 2020-10-08 DIAGNOSIS — F32.A DEPRESSION, UNSPECIFIED DEPRESSION TYPE: Primary | ICD-10-CM

## 2020-10-08 DIAGNOSIS — F41.9 ANXIETY: ICD-10-CM

## 2020-10-08 PROCEDURE — 99213 OFFICE O/P EST LOW 20 MIN: CPT | Performed by: FAMILY MEDICINE

## 2020-10-08 NOTE — TELEPHONE ENCOUNTER
Patient is calling in to request a note from work. 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  Patient is request

## 2020-10-08 NOTE — PROGRESS NOTES
Virtual Telephone Check-In    Coltonradha DalyPenaloza verbally consents to a Virtual/Telephone Check-In visit on 10/08/20. Patient has been referred to the [facility] website at [email or web address] to review the yearly Consent to Treat document.     Patient Latonia Forrest radiology tests and decision making. Appropriate medical decision-making and tests are ordered as detailed in the plan of care above.                 Indio Latham DO

## 2020-11-02 RX ORDER — BUPROPION HYDROCHLORIDE 150 MG/1
TABLET ORAL
Qty: 90 TABLET | Refills: 0 | Status: SHIPPED | OUTPATIENT
Start: 2020-11-02 | End: 2021-01-29

## 2020-11-04 ENCOUNTER — TELEPHONE (OUTPATIENT)
Dept: FAMILY MEDICINE CLINIC | Facility: CLINIC | Age: 55
End: 2020-11-04

## 2020-11-04 DIAGNOSIS — Z12.31 BREAST CANCER SCREENING BY MAMMOGRAM: Primary | ICD-10-CM

## 2020-11-04 NOTE — TELEPHONE ENCOUNTER
Dr. Gualberto Everett, patient is requesting a mammogram order. Last mammogram was completed 11/02/2019. Please advise on order.       =====  CONCLUSION:       BI-RADS CATEGORY:     DIAGNOSTIC CATEGORY 1--NEGATIVE NO CHANGE FROM COMPARISON ASSESSMENT.        RECOMMEND

## 2020-11-22 ENCOUNTER — APPOINTMENT (OUTPATIENT)
Dept: GENERAL RADIOLOGY | Facility: HOSPITAL | Age: 55
End: 2020-11-22
Attending: EMERGENCY MEDICINE
Payer: COMMERCIAL

## 2020-11-22 ENCOUNTER — HOSPITAL ENCOUNTER (EMERGENCY)
Facility: HOSPITAL | Age: 55
Discharge: HOME | End: 2020-11-22
Attending: EMERGENCY MEDICINE
Payer: COMMERCIAL

## 2020-11-22 VITALS
OXYGEN SATURATION: 99 % | WEIGHT: 130 LBS | HEART RATE: 77 BPM | BODY MASS INDEX: 21.66 KG/M2 | SYSTOLIC BLOOD PRESSURE: 126 MMHG | RESPIRATION RATE: 16 BRPM | DIASTOLIC BLOOD PRESSURE: 89 MMHG | TEMPERATURE: 99 F | HEIGHT: 65 IN

## 2020-11-22 DIAGNOSIS — S93.402A MILD SPRAIN OF LEFT ANKLE, INITIAL ENCOUNTER: Primary | ICD-10-CM

## 2020-11-22 PROCEDURE — 73610 X-RAY EXAM OF ANKLE: CPT | Performed by: EMERGENCY MEDICINE

## 2020-11-22 PROCEDURE — 99283 EMERGENCY DEPT VISIT LOW MDM: CPT

## 2020-11-22 NOTE — ED PROVIDER NOTES
Patient Seen in: [facility] AND [facility] Emergency Department      History   Patient presents with: Ankle Injury    Stated Complaint: ankle injury    HPI  Pt is a 47y healthy female presenting with ankle injury.   Patient reports playing pickle ball when she r are moist.   Eyes:      Extraocular Movements: Extraocular movements intact. Conjunctiva/sclera: Conjunctivae normal.      Pupils: Pupils are equal, round, and reactive to light. Neck:      Musculoskeletal: Normal range of motion and neck supple. and well-perfused and neurovascularly intact. Patient offered pain control but declined. X-ray negative for acute fracture or dislocation. Symptoms likely secondary to sprain. Ace applied and rice therapy discussed.   Patient discharged in stabl

## 2020-12-07 ENCOUNTER — HOSPITAL ENCOUNTER (OUTPATIENT)
Dept: MAMMOGRAPHY | Facility: HOSPITAL | Age: 55
Discharge: HOME | End: 2020-12-07
Attending: FAMILY MEDICINE
Payer: COMMERCIAL

## 2020-12-07 DIAGNOSIS — Z12.31 BREAST CANCER SCREENING BY MAMMOGRAM: ICD-10-CM

## 2020-12-07 PROCEDURE — 77067 SCR MAMMO BI INCL CAD: CPT | Performed by: FAMILY MEDICINE

## 2020-12-07 PROCEDURE — 77063 BREAST TOMOSYNTHESIS BI: CPT | Performed by: FAMILY MEDICINE

## 2021-01-11 ENCOUNTER — PATIENT MESSAGE (OUTPATIENT)
Dept: FAMILY MEDICINE CLINIC | Facility: CLINIC | Age: 56
End: 2021-01-11

## 2021-01-11 NOTE — TELEPHONE ENCOUNTER
From: Danii Ledezma  To: Derrick Christina DO  Sent: 1/11/2021 3:34 PM CST  Subject: Non-Urgent Medical Question    Is there any information about receiving the covid vaccination through this clinic?  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

## 2021-01-29 ENCOUNTER — TELEPHONE (OUTPATIENT)
Dept: FAMILY MEDICINE CLINIC | Facility: CLINIC | Age: 56
End: 2021-01-29

## 2021-01-29 RX ORDER — BUPROPION HYDROCHLORIDE 150 MG/1
TABLET ORAL
Qty: 90 TABLET | Refills: 0 | Status: SHIPPED | OUTPATIENT
Start: 2021-01-29 | End: 2021-08-25

## 2021-04-24 RX ORDER — VALACYCLOVIR HYDROCHLORIDE 500 MG/1
500 TABLET, FILM COATED ORAL 2 TIMES DAILY
Qty: 30 TABLET | Refills: 0 | Status: SHIPPED | OUTPATIENT
Start: 2021-04-24 | End: 2021-07-06

## 2021-06-02 ENCOUNTER — TELEPHONE (OUTPATIENT)
Dept: FAMILY MEDICINE CLINIC | Facility: CLINIC | Age: 56
End: 2021-06-02

## 2021-07-06 RX ORDER — VALACYCLOVIR HYDROCHLORIDE 500 MG/1
500 TABLET, FILM COATED ORAL 2 TIMES DAILY
Qty: 30 TABLET | Refills: 0 | Status: SHIPPED | OUTPATIENT
Start: 2021-07-06 | End: 2021-11-17

## 2021-07-29 ENCOUNTER — NURSE TRIAGE (OUTPATIENT)
Dept: FAMILY MEDICINE CLINIC | Facility: CLINIC | Age: 56
End: 2021-07-29

## 2021-07-29 NOTE — TELEPHONE ENCOUNTER
Action Requested: Summary for Provider     []  Critical Lab, Recommendations Needed  [] Need Additional Advice  [x]   FYI    []   Need Orders  [] Need Medications Sent to Pharmacy  []  Other     SUMMARY: patient calls and states that she developed a bliste

## 2021-07-30 ENCOUNTER — OFFICE VISIT (OUTPATIENT)
Dept: FAMILY MEDICINE CLINIC | Facility: CLINIC | Age: 56
End: 2021-07-30

## 2021-07-30 VITALS
WEIGHT: 131 LBS | TEMPERATURE: 98 F | BODY MASS INDEX: 21.83 KG/M2 | DIASTOLIC BLOOD PRESSURE: 68 MMHG | HEART RATE: 70 BPM | HEIGHT: 65 IN | SYSTOLIC BLOOD PRESSURE: 104 MMHG

## 2021-07-30 DIAGNOSIS — K12.0 APHTHOUS ULCER OF MOUTH: ICD-10-CM

## 2021-07-30 DIAGNOSIS — N89.8 VAGINAL LESION: Primary | ICD-10-CM

## 2021-07-30 PROCEDURE — 3078F DIAST BP <80 MM HG: CPT | Performed by: STUDENT IN AN ORGANIZED HEALTH CARE EDUCATION/TRAINING PROGRAM

## 2021-07-30 PROCEDURE — 3008F BODY MASS INDEX DOCD: CPT | Performed by: STUDENT IN AN ORGANIZED HEALTH CARE EDUCATION/TRAINING PROGRAM

## 2021-07-30 PROCEDURE — 99213 OFFICE O/P EST LOW 20 MIN: CPT | Performed by: STUDENT IN AN ORGANIZED HEALTH CARE EDUCATION/TRAINING PROGRAM

## 2021-07-30 PROCEDURE — 3074F SYST BP LT 130 MM HG: CPT | Performed by: STUDENT IN AN ORGANIZED HEALTH CARE EDUCATION/TRAINING PROGRAM

## 2021-07-30 RX ORDER — CLINDAMYCIN PHOSPHATE 10 MG/G
1 GEL TOPICAL 2 TIMES DAILY
Qty: 30 G | Refills: 0 | Status: SHIPPED | OUTPATIENT
Start: 2021-07-30 | End: 2021-09-30

## 2021-07-30 NOTE — PROGRESS NOTES
HPI:    Patient ID: Connie Martell is a 55year old female. HPI  Pt presenting with oral sores. She reports onset of ulcers in mouth 2 weeks ago following mild URI with runny nose and congestion.  Since that time lesions have improved somewhat, but she co ulcer on roof of mouth with minimal erythema  Eyes:      Conjunctiva/sclera: Conjunctivae normal.   Cardiovascular:      Rate and Rhythm: Normal rate and regular rhythm. Pulses: Normal pulses.    Pulmonary:      Effort: Pulmonary effort is normal. No r

## 2021-08-25 RX ORDER — BUPROPION HYDROCHLORIDE 150 MG/1
TABLET ORAL
Qty: 90 TABLET | Refills: 0 | Status: SHIPPED | OUTPATIENT
Start: 2021-08-25 | End: 2022-02-02

## 2021-09-28 ENCOUNTER — NURSE TRIAGE (OUTPATIENT)
Dept: FAMILY MEDICINE CLINIC | Facility: CLINIC | Age: 56
End: 2021-09-28

## 2021-09-28 NOTE — TELEPHONE ENCOUNTER
Pt reports chest pain on and off for the past couple of months now. Denies shortness of breath, dizziness, numbness and tingling to extremities. Pt mentioned it happened when she was playing pickle ball, had to stop playing for a few minutes.   Pt states

## 2021-09-28 NOTE — TELEPHONE ENCOUNTER
Patient called back. Went over message per previous RN. Patient denied worsening symptoms. Chest pain on/off. Offered appt today with FM providers at [facility]. Patient not able to come in. 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    Molly

## 2021-09-30 ENCOUNTER — LAB ENCOUNTER (OUTPATIENT)
Dept: LAB | Age: 56
End: 2021-09-30
Attending: FAMILY MEDICINE
Payer: COMMERCIAL

## 2021-09-30 ENCOUNTER — OFFICE VISIT (OUTPATIENT)
Dept: FAMILY MEDICINE CLINIC | Facility: CLINIC | Age: 56
End: 2021-09-30

## 2021-09-30 VITALS
HEIGHT: 65 IN | DIASTOLIC BLOOD PRESSURE: 88 MMHG | RESPIRATION RATE: 16 BRPM | BODY MASS INDEX: 21.83 KG/M2 | HEART RATE: 76 BPM | WEIGHT: 131 LBS | SYSTOLIC BLOOD PRESSURE: 134 MMHG

## 2021-09-30 DIAGNOSIS — R07.82 INTERCOSTAL PAIN: Primary | ICD-10-CM

## 2021-09-30 DIAGNOSIS — R07.9 CHEST PAIN, UNSPECIFIED TYPE: ICD-10-CM

## 2021-09-30 PROCEDURE — 99214 OFFICE O/P EST MOD 30 MIN: CPT | Performed by: FAMILY MEDICINE

## 2021-09-30 PROCEDURE — 93005 ELECTROCARDIOGRAM TRACING: CPT

## 2021-09-30 PROCEDURE — 3079F DIAST BP 80-89 MM HG: CPT | Performed by: FAMILY MEDICINE

## 2021-09-30 PROCEDURE — 3075F SYST BP GE 130 - 139MM HG: CPT | Performed by: FAMILY MEDICINE

## 2021-09-30 PROCEDURE — 3008F BODY MASS INDEX DOCD: CPT | Performed by: FAMILY MEDICINE

## 2021-09-30 PROCEDURE — 93010 ELECTROCARDIOGRAM REPORT: CPT | Performed by: FAMILY MEDICINE

## 2021-09-30 NOTE — PROGRESS NOTES
Subjective:   Patient ID: Lima Núñez is a 55year old female. HPI  Patient presents with:  Chest Pain: on and off for 5 months  chest wall, most often passes in minutes.   Two episodes while playing pickle ball she noticed the pain, stopped playing an orders of the defined types were placed in this encounter.       Meds This Visit:  Requested Prescriptions      No prescriptions requested or ordered in this encounter       Imaging & Referrals:  EKG 12-LEAD

## 2021-11-17 ENCOUNTER — OFFICE VISIT (OUTPATIENT)
Dept: FAMILY MEDICINE CLINIC | Facility: CLINIC | Age: 56
End: 2021-11-17

## 2021-11-17 VITALS
DIASTOLIC BLOOD PRESSURE: 76 MMHG | HEART RATE: 76 BPM | TEMPERATURE: 98 F | BODY MASS INDEX: 22.33 KG/M2 | RESPIRATION RATE: 16 BRPM | HEIGHT: 65 IN | WEIGHT: 134 LBS | SYSTOLIC BLOOD PRESSURE: 113 MMHG

## 2021-11-17 DIAGNOSIS — Z12.31 ENCOUNTER FOR SCREENING MAMMOGRAM FOR BREAST CANCER: ICD-10-CM

## 2021-11-17 DIAGNOSIS — R00.0 TACHYCARDIA: ICD-10-CM

## 2021-11-17 DIAGNOSIS — L91.8 INFLAMED SKIN TAG: ICD-10-CM

## 2021-11-17 DIAGNOSIS — Z00.00 ROUTINE PHYSICAL EXAMINATION: Primary | ICD-10-CM

## 2021-11-17 DIAGNOSIS — F41.9 ANXIETY: ICD-10-CM

## 2021-11-17 PROCEDURE — 3078F DIAST BP <80 MM HG: CPT | Performed by: FAMILY MEDICINE

## 2021-11-17 PROCEDURE — 3074F SYST BP LT 130 MM HG: CPT | Performed by: FAMILY MEDICINE

## 2021-11-17 PROCEDURE — 93270 REMOTE 30 DAY ECG REV/REPORT: CPT | Performed by: FAMILY MEDICINE

## 2021-11-17 PROCEDURE — 99396 PREV VISIT EST AGE 40-64: CPT | Performed by: FAMILY MEDICINE

## 2021-11-17 PROCEDURE — 3008F BODY MASS INDEX DOCD: CPT | Performed by: FAMILY MEDICINE

## 2021-11-17 RX ORDER — VALACYCLOVIR HYDROCHLORIDE 500 MG/1
500 TABLET, FILM COATED ORAL 2 TIMES DAILY
Qty: 30 TABLET | Refills: 1 | Status: CANCELLED | OUTPATIENT
Start: 2021-11-17

## 2021-11-17 RX ORDER — VALACYCLOVIR HYDROCHLORIDE 500 MG/1
500 TABLET, FILM COATED ORAL 2 TIMES DAILY
Qty: 30 TABLET | Refills: 1 | Status: SHIPPED | OUTPATIENT
Start: 2021-11-17

## 2021-11-17 NOTE — PROGRESS NOTES
HPI: Fuad Amaro is a 55year old female who presents for physical. Works as a teacher. Exercising regularly. Eats healthy. . Has 2 adult children. Depression is controlled. On Bupropion. Uses light. In menopause. Last pap was last year.  Sexual Psych: Orientated to person, place, and time. Behavior and affect appropriate for age. Assessment:/Plan:  Routine physical examination     Healthy. Labs ordered. Encounter for screening mammogram for breast cancer     Mammogram ordered.   Will

## 2021-11-19 ENCOUNTER — TELEPHONE (OUTPATIENT)
Dept: FAMILY MEDICINE CLINIC | Facility: CLINIC | Age: 56
End: 2021-11-19

## 2021-11-19 NOTE — TELEPHONE ENCOUNTER
Patient can  30 day monitor at [facility] Cardiology phone number [phone]. Patient was called to notify.

## 2021-11-19 NOTE — TELEPHONE ENCOUNTER
Patient has questions about retrieving 30 day event monitor, states does not have a phone number to contact for pickup.      EVENT MONITOR [facility] [id number] (Order [id number]

## 2021-11-22 ENCOUNTER — TELEPHONE (OUTPATIENT)
Dept: CARDIOLOGY CLINIC | Facility: CLINIC | Age: 56
End: 2021-11-22

## 2021-11-22 DIAGNOSIS — R00.0 TACHYCARDIA, UNSPECIFIED: Primary | ICD-10-CM

## 2021-11-22 NOTE — TELEPHONE ENCOUNTER
Spoke to patient and informed her that Dr Earline Ramirez changed her order to a Zio patch and she can pick it up at [facility] cardiac dx.  Patient verbalized understanding

## 2021-11-22 NOTE — TELEPHONE ENCOUNTER
Patient was given an order for 30 day event monitor. Unfortunately we no longer offer the 30 day event monitor. We currently offer 7-14 zio patch that would be placed at [address] [place] [address] cardiac diagnostics.  If you prefer the zio patch for this patient, please sign the

## 2021-11-23 ENCOUNTER — HOSPITAL ENCOUNTER (OUTPATIENT)
Dept: CV DIAGNOSTICS | Facility: HOSPITAL | Age: 56
Discharge: HOME | End: 2021-11-23
Attending: FAMILY MEDICINE
Payer: COMMERCIAL

## 2021-11-23 DIAGNOSIS — R00.0 TACHYCARDIA, UNSPECIFIED: ICD-10-CM

## 2021-11-23 PROCEDURE — 93247 EXT ECG>7D<15D SCAN A/R: CPT | Performed by: FAMILY MEDICINE

## 2021-11-23 PROCEDURE — 93248 EXT ECG>7D<15D REV&INTERPJ: CPT | Performed by: FAMILY MEDICINE

## 2021-11-23 PROCEDURE — 93246 EXT ECG>7D<15D RECORDING: CPT | Performed by: FAMILY MEDICINE

## 2021-11-24 ENCOUNTER — LAB ENCOUNTER (OUTPATIENT)
Dept: LAB | Facility: HOSPITAL | Age: 56
End: 2021-11-24
Attending: FAMILY MEDICINE
Payer: COMMERCIAL

## 2021-11-24 DIAGNOSIS — Z00.00 ROUTINE PHYSICAL EXAMINATION: ICD-10-CM

## 2021-11-24 PROCEDURE — 85027 COMPLETE CBC AUTOMATED: CPT

## 2021-11-24 PROCEDURE — 36415 COLL VENOUS BLD VENIPUNCTURE: CPT

## 2021-11-24 PROCEDURE — 80053 COMPREHEN METABOLIC PANEL: CPT

## 2021-11-24 PROCEDURE — 83036 HEMOGLOBIN GLYCOSYLATED A1C: CPT

## 2021-11-24 PROCEDURE — 84443 ASSAY THYROID STIM HORMONE: CPT

## 2021-11-24 PROCEDURE — 80061 LIPID PANEL: CPT

## 2021-12-02 ENCOUNTER — OFFICE VISIT (OUTPATIENT)
Dept: FAMILY MEDICINE CLINIC | Facility: CLINIC | Age: 56
End: 2021-12-02

## 2021-12-02 VITALS
SYSTOLIC BLOOD PRESSURE: 114 MMHG | TEMPERATURE: 98 F | DIASTOLIC BLOOD PRESSURE: 77 MMHG | HEART RATE: 75 BPM | RESPIRATION RATE: 16 BRPM

## 2021-12-02 DIAGNOSIS — L91.8 INFLAMED SKIN TAG: Primary | ICD-10-CM

## 2021-12-02 PROCEDURE — 3078F DIAST BP <80 MM HG: CPT | Performed by: FAMILY MEDICINE

## 2021-12-02 PROCEDURE — 3074F SYST BP LT 130 MM HG: CPT | Performed by: FAMILY MEDICINE

## 2021-12-02 PROCEDURE — 11200 RMVL SKIN TAGS UP TO&INC 15: CPT | Performed by: FAMILY MEDICINE

## 2021-12-02 NOTE — PROGRESS NOTES
HPI: Connor Moore is a 55year old female who presents for skin tag removal. Skin tag noted to left side of neck. Irritates her.      PMH:    Past Medical History:   Diagnosis Date   • Anxiety    • Chronic rhinitis    • Depression    • DUB (dysfunctional uterine b

## 2021-12-14 ENCOUNTER — HOSPITAL ENCOUNTER (OUTPATIENT)
Dept: MAMMOGRAPHY | Facility: HOSPITAL | Age: 56
Discharge: HOME | End: 2021-12-14
Attending: FAMILY MEDICINE
Payer: COMMERCIAL

## 2021-12-14 DIAGNOSIS — Z12.31 ENCOUNTER FOR SCREENING MAMMOGRAM FOR BREAST CANCER: ICD-10-CM

## 2021-12-14 PROCEDURE — 77063 BREAST TOMOSYNTHESIS BI: CPT | Performed by: FAMILY MEDICINE

## 2021-12-14 PROCEDURE — 77067 SCR MAMMO BI INCL CAD: CPT | Performed by: FAMILY MEDICINE

## 2021-12-22 DIAGNOSIS — R00.2 HEART PALPITATIONS: Primary | ICD-10-CM

## 2022-02-02 RX ORDER — BUPROPION HYDROCHLORIDE 150 MG/1
150 TABLET ORAL EVERY MORNING
Qty: 90 TABLET | Refills: 1 | Status: SHIPPED | OUTPATIENT
Start: 2022-02-02 | End: 2022-07-27

## 2022-02-02 NOTE — TELEPHONE ENCOUNTER
Refill passed per [facility] protocol. Requested Prescriptions   Pending Prescriptions Disp Refills    buPROPion 150 MG Oral Tablet 24 Hr 90 tablet 0     Sig: Take 1 tablet (150 mg total) by mouth every morning.         Psychiatric Non-Scheduled (Anti-Anxiety) Passed - 2/2/2022  8:27 AM        Passed - Appointment in last 6 or next 3 months              Recent Outpatient Visits              2 months ago Inflamed skin tag    [facility], Kenneth [place], [place]    Office Visit    2 months ago Routine physical examination    [facility], Kenneth [place], [place]    Office Visit    4 months ago Intercostal pain    [facility], [address], [place],     Office Visit    6 months ago Vaginal lesion    Natasha Gallegos MD    Office Visit    1 year ago Depression, unspecified depression type    [facility], Angela Moon DO    Virtual Phone E/M

## 2022-02-24 ENCOUNTER — TELEPHONE (OUTPATIENT)
Dept: FAMILY MEDICINE CLINIC | Facility: CLINIC | Age: 57
End: 2022-02-24

## 2022-02-24 DIAGNOSIS — M54.41 RIGHT-SIDED LOW BACK PAIN WITH RIGHT-SIDED SCIATICA, UNSPECIFIED CHRONICITY: Primary | ICD-10-CM

## 2022-03-10 ENCOUNTER — OFFICE VISIT (OUTPATIENT)
Dept: PHYSICAL MEDICINE AND REHAB | Facility: CLINIC | Age: 57
End: 2022-03-10
Payer: COMMERCIAL

## 2022-03-10 VITALS
SYSTOLIC BLOOD PRESSURE: 112 MMHG | WEIGHT: 134 LBS | BODY MASS INDEX: 22.33 KG/M2 | HEART RATE: 91 BPM | DIASTOLIC BLOOD PRESSURE: 72 MMHG | OXYGEN SATURATION: 99 % | HEIGHT: 65 IN

## 2022-03-10 DIAGNOSIS — M54.16 RIGHT LUMBAR RADICULOPATHY: Primary | ICD-10-CM

## 2022-03-10 DIAGNOSIS — M54.16 LUMBAR RADICULOPATHY, CHRONIC: ICD-10-CM

## 2022-03-10 PROCEDURE — 99214 OFFICE O/P EST MOD 30 MIN: CPT | Performed by: PHYSICAL MEDICINE & REHABILITATION

## 2022-03-10 PROCEDURE — 3078F DIAST BP <80 MM HG: CPT | Performed by: PHYSICAL MEDICINE & REHABILITATION

## 2022-03-10 PROCEDURE — 3074F SYST BP LT 130 MM HG: CPT | Performed by: PHYSICAL MEDICINE & REHABILITATION

## 2022-03-10 PROCEDURE — 3008F BODY MASS INDEX DOCD: CPT | Performed by: PHYSICAL MEDICINE & REHABILITATION

## 2022-03-10 NOTE — PATIENT INSTRUCTIONS
-Start Lyrica twice daily  -Start PT and home exercises  -Follow up in 4 weeks  -If no better, will discuss MRI

## 2022-03-15 ENCOUNTER — TELEPHONE (OUTPATIENT)
Dept: NEUROLOGY | Facility: CLINIC | Age: 57
End: 2022-03-15

## 2022-03-15 PROBLEM — M54.16 RIGHT LUMBAR RADICULOPATHY: Status: ACTIVE | Noted: 2022-03-15

## 2022-03-15 NOTE — TELEPHONE ENCOUNTER
Physical Therapy CPT Codes: [id number] ,[id number] -pending approval     Clinical notes sent to [facility] for review.

## 2022-03-17 NOTE — TELEPHONE ENCOUNTER
Received in-basket from [facility] with approval for 12 session of PT    valid 03/10/22 thru 06/16/22.      Status: APPROVED     Notified patient via My Chart

## 2022-04-08 ENCOUNTER — TELEPHONE (OUTPATIENT)
Dept: PHYSICAL THERAPY | Facility: HOSPITAL | Age: 57
End: 2022-04-08

## 2022-04-08 NOTE — TELEPHONE ENCOUNTER
Called pt and left message advising of 4/11 3:45 appt time. Left dept number asking pt to call to confirm or deny appt.

## 2022-04-11 ENCOUNTER — TELEPHONE (OUTPATIENT)
Dept: PHYSICAL THERAPY | Facility: HOSPITAL | Age: 57
End: 2022-04-11

## 2022-04-25 ENCOUNTER — TELEPHONE (OUTPATIENT)
Dept: CASE MANAGEMENT | Age: 57
End: 2022-04-25

## 2022-04-25 ENCOUNTER — APPOINTMENT (OUTPATIENT)
Dept: PHYSICAL THERAPY | Facility: HOSPITAL | Age: 57
End: 2022-04-25
Attending: PHYSICAL MEDICINE & REHABILITATION
Payer: COMMERCIAL

## 2022-04-25 NOTE — TELEPHONE ENCOUNTER
Dr. Reynaldo Barton,    Patient called requesting referral to [facility] for an URGENT referral for floaters and flashing lights. Pended referral please review diagnosis and sign off if you agree. Thank you.   Sky Hernandez  Floaters and flashing lights

## 2022-05-09 ENCOUNTER — TELEPHONE (OUTPATIENT)
Dept: PHYSICAL THERAPY | Facility: HOSPITAL | Age: 57
End: 2022-05-09

## 2022-05-09 ENCOUNTER — APPOINTMENT (OUTPATIENT)
Dept: PHYSICAL THERAPY | Facility: HOSPITAL | Age: 57
End: 2022-05-09
Attending: PHYSICAL MEDICINE & REHABILITATION
Payer: COMMERCIAL

## 2022-05-16 ENCOUNTER — APPOINTMENT (OUTPATIENT)
Dept: PHYSICAL THERAPY | Facility: HOSPITAL | Age: 57
End: 2022-05-16
Attending: PHYSICAL MEDICINE & REHABILITATION
Payer: COMMERCIAL

## 2022-05-18 ENCOUNTER — TELEPHONE (OUTPATIENT)
Dept: CASE MANAGEMENT | Age: 57
End: 2022-05-18

## 2022-05-18 DIAGNOSIS — H43.393 VITREOUS FLOATERS OF BOTH EYES: Primary | ICD-10-CM

## 2022-05-18 NOTE — TELEPHONE ENCOUNTER
Spoke with patient, (  verified ) informed that Referred has been placed as requested     Patient verbalizes understanding

## 2022-05-18 NOTE — TELEPHONE ENCOUNTER
Dr. Clarice Burr,    The patient called requesting referral for second opinion for laser treatment, she would like to see Dr. Tuan Buckner for appointment 5/19/22 for second opinion. Pended referral please review diagnosis and sign off if you agree. Thank you.   Brooklyn Garcia  Referral Department

## 2022-05-18 NOTE — TELEPHONE ENCOUNTER
Patient calling ( identified name and  ) checking on status of referral   explained Dr. Carmen Eubanks is in clinic at the present time     Patient verbalizes understanding

## 2022-05-19 ENCOUNTER — TELEPHONE (OUTPATIENT)
Dept: FAMILY MEDICINE CLINIC | Facility: CLINIC | Age: 57
End: 2022-05-19

## 2022-05-19 NOTE — TELEPHONE ENCOUNTER
Patient called office. Patient's date of birth and full name both confirmed. She's at ophthalmologist office, for appointment today. They do not have the referral.   The  there provided this fax number: [phone] (verified by read-back)     RN faxed Referral to their office. Also copy-pasted referral to a message to patient's [employer].

## 2022-05-23 ENCOUNTER — APPOINTMENT (OUTPATIENT)
Dept: PHYSICAL THERAPY | Facility: HOSPITAL | Age: 57
End: 2022-05-23
Attending: PHYSICAL MEDICINE & REHABILITATION
Payer: COMMERCIAL

## 2022-06-06 ENCOUNTER — APPOINTMENT (OUTPATIENT)
Dept: PHYSICAL THERAPY | Facility: HOSPITAL | Age: 57
End: 2022-06-06
Attending: PHYSICAL MEDICINE & REHABILITATION
Payer: COMMERCIAL

## 2022-06-13 ENCOUNTER — APPOINTMENT (OUTPATIENT)
Dept: PHYSICAL THERAPY | Facility: HOSPITAL | Age: 57
End: 2022-06-13
Attending: PHYSICAL MEDICINE & REHABILITATION
Payer: COMMERCIAL

## 2022-06-20 ENCOUNTER — APPOINTMENT (OUTPATIENT)
Dept: PHYSICAL THERAPY | Facility: HOSPITAL | Age: 57
End: 2022-06-20
Attending: PHYSICAL MEDICINE & REHABILITATION
Payer: COMMERCIAL

## 2022-06-27 ENCOUNTER — APPOINTMENT (OUTPATIENT)
Dept: PHYSICAL THERAPY | Facility: HOSPITAL | Age: 57
End: 2022-06-27
Attending: PHYSICAL MEDICINE & REHABILITATION
Payer: COMMERCIAL

## 2022-07-27 RX ORDER — BUPROPION HYDROCHLORIDE 150 MG/1
TABLET ORAL
Qty: 90 TABLET | Refills: 0 | Status: SHIPPED | OUTPATIENT
Start: 2022-07-27

## 2022-09-22 ENCOUNTER — TELEPHONE (OUTPATIENT)
Dept: CASE MANAGEMENT | Age: 57
End: 2022-09-22

## 2022-09-22 DIAGNOSIS — L98.9 SKIN LESION OF BACK: Primary | ICD-10-CM

## 2022-09-22 NOTE — TELEPHONE ENCOUNTER
Dr. Deleon Homes,    The patient is requesting a referral to   Dr. Dillan Deras with [facility] for a mole on her back. Pended referral please review diagnosis and sign off if you agree. Thank you.   Sky Hernandez

## 2022-10-11 ENCOUNTER — TELEPHONE (OUTPATIENT)
Dept: FAMILY MEDICINE CLINIC | Facility: CLINIC | Age: 57
End: 2022-10-11

## 2022-10-11 DIAGNOSIS — M54.50 LOW BACK PAIN, UNSPECIFIED BACK PAIN LATERALITY, UNSPECIFIED CHRONICITY, UNSPECIFIED WHETHER SCIATICA PRESENT: Primary | ICD-10-CM

## 2022-10-11 NOTE — TELEPHONE ENCOUNTER
Patient states that she is returning Kenya's call and she is the one helping her with the referral.  Direct message sent to [facility] response that she will call her back later. Informed patient  And she will wait.

## 2022-10-11 NOTE — TELEPHONE ENCOUNTER
Patient calling, confirmed name and . Requesting a referral for chiropractor for appointment today. Dr. Farooq Donato (out of network)  [phone]    Lower back pain. Appointment is at 3:30. She is requesting that once the referral is signed that it be faxed.

## 2022-10-11 NOTE — TELEPHONE ENCOUNTER
Called patient back, confirmed name and . She states that she forgot that her insurance switched from PPO to HMO. She will see the out-of-network chiropractor today but asks that Dr. Gunjan Sorensen sign the in-network referral for possible future appointments.

## 2022-10-11 NOTE — TELEPHONE ENCOUNTER
Spoke to Lexus Monsivais in [facility] and she explains that this patient's insurance requires her to see an in-network provider. Attempted to contact patient to explain. Left message for patient to call back. In-network referral pended.

## 2022-11-28 ENCOUNTER — LAB REQUISITION (OUTPATIENT)
Dept: LAB | Facility: HOSPITAL | Age: 57
End: 2022-11-28
Payer: COMMERCIAL

## 2022-11-28 ENCOUNTER — APPOINTMENT (OUTPATIENT)
Dept: URBAN - METROPOLITAN AREA CLINIC 244 | Age: 57
Setting detail: DERMATOLOGY
End: 2022-11-29

## 2022-11-28 DIAGNOSIS — L81.4 OTHER MELANIN HYPERPIGMENTATION: ICD-10-CM

## 2022-11-28 DIAGNOSIS — L82.1 OTHER SEBORRHEIC KERATOSIS: ICD-10-CM

## 2022-11-28 DIAGNOSIS — D48.5 NEOPLASM OF UNCERTAIN BEHAVIOR OF SKIN: ICD-10-CM

## 2022-11-28 DIAGNOSIS — D22 MELANOCYTIC NEVI: ICD-10-CM

## 2022-11-28 PROBLEM — D22.5 MELANOCYTIC NEVI OF TRUNK: Status: ACTIVE | Noted: 2022-11-28

## 2022-11-28 PROCEDURE — 11102 TANGNTL BX SKIN SINGLE LES: CPT

## 2022-11-28 PROCEDURE — OTHER COUNSELING: OTHER

## 2022-11-28 PROCEDURE — OTHER BIOPSY BY SHAVE METHOD: OTHER

## 2022-11-28 PROCEDURE — 99203 OFFICE O/P NEW LOW 30 MIN: CPT | Mod: 25

## 2022-11-28 PROCEDURE — 11103 TANGNTL BX SKIN EA SEP/ADDL: CPT

## 2022-11-28 PROCEDURE — 88305 TISSUE EXAM BY PATHOLOGIST: CPT | Performed by: STUDENT IN AN ORGANIZED HEALTH CARE EDUCATION/TRAINING PROGRAM

## 2022-11-28 PROCEDURE — 88305 TISSUE EXAM BY PATHOLOGIST: CPT

## 2022-11-28 ASSESSMENT — LOCATION DETAILED DESCRIPTION DERM
LOCATION DETAILED: UPPER STERNUM
LOCATION DETAILED: LEFT MEDIAL UPPER BACK
LOCATION DETAILED: RIGHT SUPERIOR MEDIAL UPPER BACK

## 2022-11-28 ASSESSMENT — LOCATION SIMPLE DESCRIPTION DERM
LOCATION SIMPLE: RIGHT UPPER BACK
LOCATION SIMPLE: CHEST
LOCATION SIMPLE: LEFT UPPER BACK

## 2022-11-28 ASSESSMENT — LOCATION ZONE DERM: LOCATION ZONE: TRUNK

## 2022-11-28 NOTE — PROCEDURE: BIOPSY BY SHAVE METHOD
Render Path Notes In Note?: No
Information: Selecting Yes will display possible errors in your note based on the variables you have selected. This validation is only offered as a suggestion for you. PLEASE NOTE THAT THE VALIDATION TEXT WILL BE REMOVED WHEN YOU FINALIZE YOUR NOTE. IF YOU WANT TO FAX A PRELIMINARY NOTE YOU WILL NEED TO TOGGLE THIS TO 'NO' IF YOU DO NOT WANT IT IN YOUR FAXED NOTE.
Biopsy Method: double edge Personna blade
Size Of Lesion In Cm: 0
Electrodesiccation Text: The wound bed was treated with electrodesiccation after the biopsy was performed.
Dressing: bandage
Hemostasis: Drysol
Depth Of Biopsy: dermis
Electrodesiccation And Curettage Text: The wound bed was treated with electrodesiccation and curettage after the biopsy was performed.
Post-Care Instructions: I reviewed with the patient in detail post-care instructions. Patient is to keep the biopsy site dry overnight, and then apply vaseline once or twice daily until healed.
Notification Instructions: Patient will be notified of biopsy results. However, patient instructed to call the office if not contacted within 2 weeks.
Biopsy Method: Dermablade
Type Of Destruction Used: Curettage
Billing Type: Third-Party Bill
Wound Care: Petrolatum
Was A Bandage Applied: Yes
Anesthesia Type: 1% lidocaine with epinephrine
Consent: Written consent was obtained and risks were reviewed including but not limited to scarring, infection, bleeding, scabbing, incomplete removal, nerve damage and allergy to anesthesia.
Path Notes (To The Dermatopathologist): rule out basal cell carcinoma vs other NMSC vs other
Detail Level: Detailed
Anesthesia Volume In Cc (Will Not Render If 0): 1
Anesthesia Type: 0.5% lidocaine with 1:200,000 epinephrine and a 1:10 solution of 8.4% sodium bicarbonate
Biopsy Type: H and E
Curettage Text: The wound bed was treated with curettage after the biopsy was performed.
Anesthesia Volume In Cc (Will Not Render If 0): 0.5
Cryotherapy Text: The wound bed was treated with cryotherapy after the biopsy was performed.
Post-Care Instructions: I reviewed with the patient in detail post-care instructions. Patient is to keep the biopsy site dry overnight, and then apply Petrolatum twice daily until healed, or after a night or two leave area open and dry overnight and a scab will form which will fall off on its own in a few weeks.
Silver Nitrate Text: The wound bed was treated with silver nitrate after the biopsy was performed.

## 2022-11-30 ENCOUNTER — TELEPHONE (OUTPATIENT)
Dept: CASE MANAGEMENT | Age: 57
End: 2022-11-30

## 2022-11-30 DIAGNOSIS — Z12.31 ENCOUNTER FOR SCREENING MAMMOGRAM FOR BREAST CANCER: Primary | ICD-10-CM

## 2022-11-30 NOTE — TELEPHONE ENCOUNTER
Dr. Kasia Beard,    The patient is requesting an order for a mammogram.     Please call patient when order has been placed. Thank you.

## 2022-12-15 ENCOUNTER — OFFICE VISIT (OUTPATIENT)
Dept: FAMILY MEDICINE CLINIC | Facility: CLINIC | Age: 57
End: 2022-12-15
Payer: COMMERCIAL

## 2022-12-15 VITALS
TEMPERATURE: 98 F | RESPIRATION RATE: 16 BRPM | BODY MASS INDEX: 22.43 KG/M2 | HEIGHT: 64.76 IN | DIASTOLIC BLOOD PRESSURE: 72 MMHG | SYSTOLIC BLOOD PRESSURE: 114 MMHG | HEART RATE: 78 BPM | WEIGHT: 133 LBS

## 2022-12-15 DIAGNOSIS — M54.41 RIGHT-SIDED LOW BACK PAIN WITH RIGHT-SIDED SCIATICA, UNSPECIFIED CHRONICITY: ICD-10-CM

## 2022-12-15 DIAGNOSIS — Z00.00 ROUTINE PHYSICAL EXAMINATION: Primary | ICD-10-CM

## 2022-12-15 DIAGNOSIS — N89.8 VAGINAL DRYNESS: ICD-10-CM

## 2022-12-15 DIAGNOSIS — F33.42 RECURRENT MAJOR DEPRESSIVE DISORDER, IN FULL REMISSION (HCC): ICD-10-CM

## 2022-12-15 LAB
ALBUMIN SERPL-MCNC: 4.2 G/DL (ref 3.4–5)
ALBUMIN/GLOB SERPL: 1.2 {RATIO} (ref 1–2)
ALP LIVER SERPL-CCNC: 82 U/L
ALT SERPL-CCNC: 23 U/L
ANION GAP SERPL CALC-SCNC: 6 MMOL/L (ref 0–18)
AST SERPL-CCNC: 18 U/L (ref 15–37)
BILIRUB SERPL-MCNC: 0.3 MG/DL (ref 0.1–2)
BUN BLD-MCNC: 11 MG/DL (ref 7–18)
BUN/CREAT SERPL: 13.8 (ref 10–20)
CALCIUM BLD-MCNC: 9.4 MG/DL (ref 8.5–10.1)
CHLORIDE SERPL-SCNC: 103 MMOL/L (ref 98–112)
CHOLEST SERPL-MCNC: 193 MG/DL (ref ?–200)
CO2 SERPL-SCNC: 30 MMOL/L (ref 21–32)
CREAT BLD-MCNC: 0.8 MG/DL
DEPRECATED RDW RBC AUTO: 39.1 FL (ref 35.1–46.3)
ERYTHROCYTE [DISTWIDTH] IN BLOOD BY AUTOMATED COUNT: 11.6 % (ref 11–15)
FASTING PATIENT LIPID ANSWER: NO
FASTING STATUS PATIENT QL REPORTED: NO
GFR SERPLBLD BASED ON 1.73 SQ M-ARVRAT: 86 ML/MIN/1.73M2 (ref 60–?)
GLOBULIN PLAS-MCNC: 3.5 G/DL (ref 2.8–4.4)
GLUCOSE BLD-MCNC: 92 MG/DL (ref 70–99)
HCT VFR BLD AUTO: 39 %
HDLC SERPL-MCNC: 65 MG/DL (ref 40–59)
HGB BLD-MCNC: 13.1 G/DL
LDLC SERPL CALC-MCNC: 109 MG/DL (ref ?–100)
MCH RBC QN AUTO: 30.8 PG (ref 26–34)
MCHC RBC AUTO-ENTMCNC: 33.6 G/DL (ref 31–37)
MCV RBC AUTO: 91.5 FL
NONHDLC SERPL-MCNC: 128 MG/DL (ref ?–130)
OSMOLALITY SERPL CALC.SUM OF ELEC: 287 MOSM/KG (ref 275–295)
PLATELET # BLD AUTO: 365 10(3)UL (ref 150–450)
POTASSIUM SERPL-SCNC: 4 MMOL/L (ref 3.5–5.1)
PROT SERPL-MCNC: 7.7 G/DL (ref 6.4–8.2)
RBC # BLD AUTO: 4.26 X10(6)UL
SODIUM SERPL-SCNC: 139 MMOL/L (ref 136–145)
TRIGL SERPL-MCNC: 106 MG/DL (ref 30–149)
TSI SER-ACNC: 2.02 MIU/ML (ref 0.36–3.74)
VLDLC SERPL CALC-MCNC: 18 MG/DL (ref 0–30)
WBC # BLD AUTO: 7.6 X10(3) UL (ref 4–11)

## 2022-12-15 PROCEDURE — 3078F DIAST BP <80 MM HG: CPT | Performed by: FAMILY MEDICINE

## 2022-12-15 PROCEDURE — 3008F BODY MASS INDEX DOCD: CPT | Performed by: FAMILY MEDICINE

## 2022-12-15 PROCEDURE — 99396 PREV VISIT EST AGE 40-64: CPT | Performed by: FAMILY MEDICINE

## 2022-12-15 PROCEDURE — 3074F SYST BP LT 130 MM HG: CPT | Performed by: FAMILY MEDICINE

## 2022-12-15 RX ORDER — ESTRADIOL 10 UG/1
INSERT VAGINAL
Qty: 18 TABLET | Refills: 1 | Status: SHIPPED | OUTPATIENT
Start: 2022-12-15 | End: 2023-01-12

## 2022-12-15 RX ORDER — VALACYCLOVIR HYDROCHLORIDE 500 MG/1
500 TABLET, FILM COATED ORAL 2 TIMES DAILY
Qty: 30 TABLET | Refills: 1 | Status: SHIPPED | OUTPATIENT
Start: 2022-12-15

## 2023-01-09 ENCOUNTER — HOSPITAL ENCOUNTER (OUTPATIENT)
Dept: MAMMOGRAPHY | Facility: HOSPITAL | Age: 58
Discharge: HOME | End: 2023-01-09
Attending: FAMILY MEDICINE
Payer: COMMERCIAL

## 2023-01-09 DIAGNOSIS — Z12.31 ENCOUNTER FOR SCREENING MAMMOGRAM FOR BREAST CANCER: ICD-10-CM

## 2023-01-09 PROCEDURE — 77063 BREAST TOMOSYNTHESIS BI: CPT | Performed by: FAMILY MEDICINE

## 2023-01-09 PROCEDURE — 77067 SCR MAMMO BI INCL CAD: CPT | Performed by: FAMILY MEDICINE

## 2023-01-16 ENCOUNTER — TELEPHONE (OUTPATIENT)
Dept: FAMILY MEDICINE CLINIC | Facility: CLINIC | Age: 58
End: 2023-01-16

## 2023-01-16 ENCOUNTER — TELEPHONE (OUTPATIENT)
Dept: CASE MANAGEMENT | Age: 58
End: 2023-01-16

## 2023-01-16 DIAGNOSIS — H43.393 VITREOUS FLOATERS OF BOTH EYES: Primary | ICD-10-CM

## 2023-01-16 NOTE — TELEPHONE ENCOUNTER
Good Afternoon Dr Samanta Wilson and staff,    Please review pended referral for Gabriella Balderrama, Dr Fern Suarez and add DX codes if you agree with plan of care. Patient has appt today, 1/16/23.     Thank you    [facility]

## 2023-01-16 NOTE — TELEPHONE ENCOUNTER
Patient is calling and stated that her referal for  needs to be updated per office.  She has an appointment scheduled for today (1/16) 3:30pm.    Fax# [phone]

## 2023-01-16 NOTE — TELEPHONE ENCOUNTER
Dr. Ruddy Jaime,     Patient is requesting a referral to Dr. Mariel Clark for an urgent appointment today at 3:30. Patient is at appointment. Pended referral please review diagnosis and sign off if you agree. Thank you.   Sky Hernandez

## 2023-01-18 ENCOUNTER — OFFICE VISIT (OUTPATIENT)
Dept: FAMILY MEDICINE CLINIC | Facility: CLINIC | Age: 58
End: 2023-01-18

## 2023-01-18 VITALS
DIASTOLIC BLOOD PRESSURE: 69 MMHG | SYSTOLIC BLOOD PRESSURE: 109 MMHG | TEMPERATURE: 98 F | RESPIRATION RATE: 16 BRPM | WEIGHT: 135 LBS | HEART RATE: 81 BPM | BODY MASS INDEX: 23 KG/M2

## 2023-01-18 DIAGNOSIS — Z01.419 ENCOUNTER FOR WELL WOMAN EXAM WITH ROUTINE GYNECOLOGICAL EXAM: Primary | ICD-10-CM

## 2023-01-18 DIAGNOSIS — K64.9 HEMORRHOIDS, UNSPECIFIED HEMORRHOID TYPE: ICD-10-CM

## 2023-01-18 PROCEDURE — 3074F SYST BP LT 130 MM HG: CPT | Performed by: FAMILY MEDICINE

## 2023-01-18 PROCEDURE — 99213 OFFICE O/P EST LOW 20 MIN: CPT | Performed by: FAMILY MEDICINE

## 2023-01-18 PROCEDURE — 3078F DIAST BP <80 MM HG: CPT | Performed by: FAMILY MEDICINE

## 2023-01-25 LAB — HPV I/H RISK 1 DNA SPEC QL NAA+PROBE: NEGATIVE

## 2023-01-26 LAB — LAST PAP RESULT: NORMAL

## 2023-01-30 ENCOUNTER — OFFICE VISIT (OUTPATIENT)
Dept: SURGERY | Facility: CLINIC | Age: 58
End: 2023-01-30

## 2023-01-30 ENCOUNTER — EKG ENCOUNTER (OUTPATIENT)
Dept: LAB | Facility: HOSPITAL | Age: 58
End: 2023-01-30
Attending: SURGERY
Payer: COMMERCIAL

## 2023-01-30 VITALS — WEIGHT: 133 LBS | HEIGHT: 65 IN | BODY MASS INDEX: 22.16 KG/M2

## 2023-01-30 DIAGNOSIS — K64.8 PROLAPSED HEMORRHOIDS: ICD-10-CM

## 2023-01-30 DIAGNOSIS — K64.4 EXTERNAL HEMORRHOIDS: Primary | ICD-10-CM

## 2023-01-30 LAB
ATRIAL RATE: 69 BPM
P AXIS: 74 DEGREES
P-R INTERVAL: 148 MS
Q-T INTERVAL: 382 MS
QRS DURATION: 70 MS
QTC CALCULATION (BEZET): 409 MS
R AXIS: 66 DEGREES
T AXIS: 45 DEGREES
VENTRICULAR RATE: 69 BPM

## 2023-01-30 PROCEDURE — 93005 ELECTROCARDIOGRAM TRACING: CPT

## 2023-01-30 PROCEDURE — 93010 ELECTROCARDIOGRAM REPORT: CPT | Performed by: INTERNAL MEDICINE

## 2023-01-30 NOTE — PATIENT INSTRUCTIONS
Same-day surgery will call the day before with instructions. Avoid aspirin and NSAIDs for 5 days prior to surgery    Your labs are up-to-date.   Please obtain a EKG

## 2023-02-20 ENCOUNTER — LAB ENCOUNTER (OUTPATIENT)
Dept: LAB | Age: 58
End: 2023-02-20
Attending: SURGERY
Payer: COMMERCIAL

## 2023-02-20 DIAGNOSIS — Z01.818 PREOP TESTING: ICD-10-CM

## 2023-02-21 LAB — SARS-COV-2 RNA RESP QL NAA+PROBE: NOT DETECTED

## 2023-02-23 ENCOUNTER — ANESTHESIA (OUTPATIENT)
Dept: SURGERY | Facility: HOSPITAL | Age: 58
End: 2023-02-23
Payer: COMMERCIAL

## 2023-02-23 ENCOUNTER — ANESTHESIA EVENT (OUTPATIENT)
Dept: SURGERY | Facility: HOSPITAL | Age: 58
End: 2023-02-23
Payer: COMMERCIAL

## 2023-02-23 ENCOUNTER — HOSPITAL ENCOUNTER (OUTPATIENT)
Facility: HOSPITAL | Age: 58
Setting detail: HOSPITAL OUTPATIENT SURGERY
Discharge: HOME | End: 2023-02-23
Attending: SURGERY | Admitting: SURGERY
Payer: COMMERCIAL

## 2023-02-23 VITALS
RESPIRATION RATE: 16 BRPM | HEART RATE: 73 BPM | WEIGHT: 134 LBS | HEIGHT: 65 IN | BODY MASS INDEX: 22.33 KG/M2 | OXYGEN SATURATION: 98 % | SYSTOLIC BLOOD PRESSURE: 100 MMHG | TEMPERATURE: 98 F | DIASTOLIC BLOOD PRESSURE: 60 MMHG

## 2023-02-23 DIAGNOSIS — Z01.818 PREOP TESTING: Primary | ICD-10-CM

## 2023-02-23 DIAGNOSIS — K64.4 EXTERNAL HEMORRHOIDS: ICD-10-CM

## 2023-02-23 DIAGNOSIS — K64.8 PROLAPSED HEMORRHOIDS: ICD-10-CM

## 2023-02-23 PROCEDURE — 46260 REMOVE IN/EX HEM GROUPS 2+: CPT | Performed by: SURGERY

## 2023-02-23 PROCEDURE — 06BY0ZC EXCISION OF HEMORRHOIDAL PLEXUS, OPEN APPROACH: ICD-10-PCS | Performed by: SURGERY

## 2023-02-23 RX ORDER — MORPHINE SULFATE 4 MG/ML
2 INJECTION, SOLUTION INTRAMUSCULAR; INTRAVENOUS EVERY 10 MIN PRN
Status: DISCONTINUED | OUTPATIENT
Start: 2023-02-23 | End: 2023-02-23

## 2023-02-23 RX ORDER — ACETAMINOPHEN 500 MG
1000 TABLET ORAL ONCE
Status: COMPLETED | OUTPATIENT
Start: 2023-02-23 | End: 2023-02-23

## 2023-02-23 RX ORDER — DIBUCAINE 0.28 G/28G
OINTMENT TOPICAL AS NEEDED
Status: DISCONTINUED | OUTPATIENT
Start: 2023-02-23 | End: 2023-02-23 | Stop reason: HOSPADM

## 2023-02-23 RX ORDER — HYDROMORPHONE HYDROCHLORIDE 1 MG/ML
0.2 INJECTION, SOLUTION INTRAMUSCULAR; INTRAVENOUS; SUBCUTANEOUS EVERY 5 MIN PRN
Status: DISCONTINUED | OUTPATIENT
Start: 2023-02-23 | End: 2023-02-23

## 2023-02-23 RX ORDER — HYDROMORPHONE HYDROCHLORIDE 1 MG/ML
0.6 INJECTION, SOLUTION INTRAMUSCULAR; INTRAVENOUS; SUBCUTANEOUS EVERY 5 MIN PRN
Status: DISCONTINUED | OUTPATIENT
Start: 2023-02-23 | End: 2023-02-23

## 2023-02-23 RX ORDER — BUPIVACAINE HYDROCHLORIDE AND EPINEPHRINE 2.5; 5 MG/ML; UG/ML
INJECTION, SOLUTION INFILTRATION; PERINEURAL AS NEEDED
Status: DISCONTINUED | OUTPATIENT
Start: 2023-02-23 | End: 2023-02-23 | Stop reason: HOSPADM

## 2023-02-23 RX ORDER — MORPHINE SULFATE 10 MG/ML
6 INJECTION, SOLUTION INTRAMUSCULAR; INTRAVENOUS EVERY 10 MIN PRN
Status: DISCONTINUED | OUTPATIENT
Start: 2023-02-23 | End: 2023-02-23

## 2023-02-23 RX ORDER — ONDANSETRON 2 MG/ML
4 INJECTION INTRAMUSCULAR; INTRAVENOUS ONCE
Status: COMPLETED | OUTPATIENT
Start: 2023-02-23 | End: 2023-02-23

## 2023-02-23 RX ORDER — CEFAZOLIN SODIUM/WATER 2 G/20 ML
2 SYRINGE (ML) INTRAVENOUS ONCE
Status: DISCONTINUED | OUTPATIENT
Start: 2023-02-23 | End: 2023-02-23 | Stop reason: HOSPADM

## 2023-02-23 RX ORDER — IBUPROFEN 600 MG/1
600 TABLET ORAL EVERY 6 HOURS PRN
Qty: 15 TABLET | Refills: 1 | Status: SHIPPED | OUTPATIENT
Start: 2023-02-23 | End: 2023-03-02

## 2023-02-23 RX ORDER — SODIUM CHLORIDE, SODIUM LACTATE, POTASSIUM CHLORIDE, CALCIUM CHLORIDE 600; 310; 30; 20 MG/100ML; MG/100ML; MG/100ML; MG/100ML
INJECTION, SOLUTION INTRAVENOUS CONTINUOUS
Status: DISCONTINUED | OUTPATIENT
Start: 2023-02-23 | End: 2023-02-23

## 2023-02-23 RX ORDER — MORPHINE SULFATE 4 MG/ML
4 INJECTION, SOLUTION INTRAMUSCULAR; INTRAVENOUS EVERY 10 MIN PRN
Status: DISCONTINUED | OUTPATIENT
Start: 2023-02-23 | End: 2023-02-23

## 2023-02-23 RX ORDER — HYDROMORPHONE HYDROCHLORIDE 1 MG/ML
0.4 INJECTION, SOLUTION INTRAMUSCULAR; INTRAVENOUS; SUBCUTANEOUS EVERY 5 MIN PRN
Status: DISCONTINUED | OUTPATIENT
Start: 2023-02-23 | End: 2023-02-23

## 2023-02-23 RX ORDER — LIDOCAINE HYDROCHLORIDE 10 MG/ML
INJECTION, SOLUTION EPIDURAL; INFILTRATION; INTRACAUDAL; PERINEURAL AS NEEDED
Status: DISCONTINUED | OUTPATIENT
Start: 2023-02-23 | End: 2023-02-23 | Stop reason: SURG

## 2023-02-23 RX ORDER — NALOXONE HYDROCHLORIDE 0.4 MG/ML
80 INJECTION, SOLUTION INTRAMUSCULAR; INTRAVENOUS; SUBCUTANEOUS AS NEEDED
Status: DISCONTINUED | OUTPATIENT
Start: 2023-02-23 | End: 2023-02-23

## 2023-02-23 RX ORDER — DEXAMETHASONE SODIUM PHOSPHATE 4 MG/ML
VIAL (ML) INJECTION AS NEEDED
Status: DISCONTINUED | OUTPATIENT
Start: 2023-02-23 | End: 2023-02-23 | Stop reason: SURG

## 2023-02-23 RX ORDER — HYDROCODONE BITARTRATE AND ACETAMINOPHEN 5; 325 MG/1; MG/1
1 TABLET ORAL EVERY 6 HOURS PRN
Qty: 15 TABLET | Refills: 0 | Status: SHIPPED | OUTPATIENT
Start: 2023-02-23

## 2023-02-23 RX ORDER — ONDANSETRON 2 MG/ML
INJECTION INTRAMUSCULAR; INTRAVENOUS AS NEEDED
Status: DISCONTINUED | OUTPATIENT
Start: 2023-02-23 | End: 2023-02-23 | Stop reason: SURG

## 2023-02-23 RX ADMIN — ONDANSETRON 4 MG: 2 INJECTION INTRAMUSCULAR; INTRAVENOUS at 09:57:00

## 2023-02-23 RX ADMIN — DEXAMETHASONE SODIUM PHOSPHATE 8 MG: 4 MG/ML VIAL (ML) INJECTION at 09:57:00

## 2023-02-23 RX ADMIN — LIDOCAINE HYDROCHLORIDE 40 MG: 10 INJECTION, SOLUTION EPIDURAL; INFILTRATION; INTRACAUDAL; PERINEURAL at 09:57:00

## 2023-02-23 RX ADMIN — SODIUM CHLORIDE, SODIUM LACTATE, POTASSIUM CHLORIDE, CALCIUM CHLORIDE: 600; 310; 30; 20 INJECTION, SOLUTION INTRAVENOUS at 09:57:00

## 2023-02-23 NOTE — ANESTHESIA PROCEDURE NOTES
Airway  Date/Time: 2/23/2023 10:03 AM  Urgency: elective    Airway not difficult    General Information and Staff    Patient location during procedure: OR  Anesthesiologist: Carlos Ronquillo MD  Resident/CRNA: Falguni Blas CRNA  Performed: CRNA   Performed by: Falguni Blas CRNA  Authorized by: Carlos Ronquillo MD      Indications and Patient Condition  Indications for airway management: airway protection and anesthesia  Preoxygenated: yes  Mask difficulty assessment: 1 - vent by mask    Final Airway Details  Final airway type: endotracheal airway      Successful airway: ETT  Cuffed: yes   Successful intubation technique: direct laryngoscopy  Facilitating devices/methods: intubating stylet and cricoid pressure  Blade: Scott  Blade size: #3  ETT size (mm): 7.0    Cormack-Lehane Classification: grade I - full view of glottis  Placement verified by: chest auscultation and capnometry

## 2023-02-23 NOTE — OPERATIVE REPORT
[facility]    PATIENT'S NAME: Richard Sherman [facility]   ATTENDING PHYSICIAN: Amrita Robles MD   OPERATING PHYSICIAN: Amrita Robles MD   PATIENT ACCOUNT#:   [de-identified]    LOCATION:  [place] [address]  MEDICAL RECORD #:   [id number]       YOB: 1965  ADMISSION DATE:       02/23/2023      OPERATION DATE:  02/23/2023    OPERATIVE REPORT      PREOPERATIVE DIAGNOSIS:  Complex hemorrhoids. POSTOPERATIVE DIAGNOSIS:  Complex hemorrhoids. PROCEDURE:  Anoscopy, complex hemorrhoidectomy x3. ASSISTANT:  RIKI Thomas    ESTIMATED BLOOD LOSS:  Minimal.    COMPLICATIONS:  None. ANESTHESIA:  General.    DISPOSITION:  To recovery tolerated well. INDICATIONS:  The patient is a pleasant 60-year-old with symptomatic hemorrhoids. Detailed informed consent obtained. OPERATIVE TECHNIQUE:  She was taken surgery, and is placed in the prone position, prepped and draped in usual sterile fashion. Anoscope is performed. There are internal and external hemorrhoids as described. Prolapse test is performed. The complexes are identified and grasped with an Allis clamp. Elliptical incision is made in the skin. Hemorrhoid dissected from the sphincter mechanism. Internal component completed using Harmonic Scalpel. Mucosa and skin approximated using running 2-0 chromic. Identical procedure performed on 3 different sites. A sphincter block performed with Marcaine as well as injection into the perianal skin. Dibucaine and Gelfoam were placed. Sterile dressing. She tolerated this well.      Dictated By Amrita Robles MD  d: 02/23/2023 10:57:33  t: 02/23/2023 16:45:23  Job [id number]  /    cc: Merari Adan DO

## 2023-02-23 NOTE — INTERVAL H&P NOTE
Pre-op Diagnosis: External hemorrhoids [K64.4]  Prolapsed hemorrhoids [K64.8]    The above referenced H&P was reviewed by Amrita Robles MD on 2/23/2023, the patient was examined and no significant changes have occurred in the patient's condition since the H&P was performed. I discussed with the patient and/or legal representative the potential benefits, risks and side effects of this procedure; the likelihood of the patient achieving goals; and potential problems that might occur during recuperation. I discussed reasonable alternatives to the procedure, including risks, benefits and side effects related to the alternatives and risks related to not receiving this procedure. We will proceed with procedure as planned.

## 2023-02-24 ENCOUNTER — TELEPHONE (OUTPATIENT)
Dept: SURGERY | Facility: CLINIC | Age: 58
End: 2023-02-24

## 2023-02-24 NOTE — TELEPHONE ENCOUNTER
Pt calling to schedule 2 week post op, no openings in [facility].  Please advise

## 2023-02-27 ENCOUNTER — TELEPHONE (OUTPATIENT)
Dept: SURGERY | Facility: CLINIC | Age: 58
End: 2023-02-27

## 2023-02-27 NOTE — TELEPHONE ENCOUNTER
Per pt had a bowel movement this morning and is in worse pain then ever. Per pt asking how to treat the pain.  Please advise

## 2023-02-27 NOTE — TELEPHONE ENCOUNTER
Spoke with patient, advising her to take sitz baths 2 - 3 times a day, to use ice packs, to take Motrin and to keep her stool soft. Patient expressed verbal understanding.

## 2023-03-01 ENCOUNTER — TELEPHONE (OUTPATIENT)
Dept: SURGERY | Facility: CLINIC | Age: 58
End: 2023-03-01

## 2023-03-01 ENCOUNTER — OFFICE VISIT (OUTPATIENT)
Dept: SURGERY | Facility: CLINIC | Age: 58
End: 2023-03-01

## 2023-03-01 VITALS — HEIGHT: 65 IN | WEIGHT: 134 LBS | BODY MASS INDEX: 22.33 KG/M2

## 2023-03-01 DIAGNOSIS — Z98.890 POST-OPERATIVE STATE: Primary | ICD-10-CM

## 2023-03-01 PROCEDURE — 3008F BODY MASS INDEX DOCD: CPT | Performed by: SURGERY

## 2023-03-01 PROCEDURE — 99024 POSTOP FOLLOW-UP VISIT: CPT | Performed by: SURGERY

## 2023-03-01 NOTE — PROGRESS NOTES
Postoperative Patient Follow-up      3/1/2023    HPI      Yolanda Ordonez is a 63year old female postop after hemorrhoidectomy. Only taking Tylenol for pain. I discussed once again trying a narcotic as she is still having quite a bit of discomfort. She will try some ibuprofen. Exam  Partially open, scant drainage normal postop      Assessment/Plan  Assessment   Post-operative state  (primary encounter diagnosis)    Continue current regimen.   Magnesia if needed for constipation  Follow-up 2 weeks         Rubén Arceo MD

## 2023-03-02 NOTE — TELEPHONE ENCOUNTER
Patient dropped off FMLA certification at her post op appt today 3-1-2-23. Patient wants to RTW on Mon 3-6-23. Patient was released to RTW on Monday if she is feeling beter. Form completed and signed by provider MD [email or web address] @ post op visit. Orig to patient and copy sent to be scanned.     SANTOS

## 2023-03-03 NOTE — TELEPHONE ENCOUNTER
DESTINEY 3-3-23 at 2:00. Patient seen in the office 3-1-23. Patient requesting X 2 more days of medical leave. Surgery was 2-23-23. Patient still having post op pain. She is . Letter approved by provider. Letter written and signed. I emailed to patient as it was needed today for school to cover. Provider approved.    SANTOS

## 2023-03-06 NOTE — TELEPHONE ENCOUNTER
Phone call to patient on 3-6-23. LM   Just wanted to make sure she got her letter and FMLA revision and make sure she was feeling better.    SANTOS

## 2023-03-15 ENCOUNTER — OFFICE VISIT (OUTPATIENT)
Dept: SURGERY | Facility: CLINIC | Age: 58
End: 2023-03-15

## 2023-03-15 DIAGNOSIS — Z98.890 POST-OPERATIVE STATE: Primary | ICD-10-CM

## 2023-03-15 PROCEDURE — 99024 POSTOP FOLLOW-UP VISIT: CPT | Performed by: SURGERY

## 2023-03-15 NOTE — PROGRESS NOTES
Postoperative Patient Follow-up      3/15/2023    HPI  Patient presents with:  Post-Op: Pt here for post op s/p hemorrhoidectomy on 2/23/23. Pt cont. To take stool softners. Pt denies complaints at this time. Wanda Joyce is a 63year old female postop visit #2 for hemorrhoidectomy. Pain much improved. Exam  Normal postop healing. Assessment/Plan  Assessment   Post-operative state  (primary encounter diagnosis)    Continue current regimen.   Follow-up for problems         Viki Castillo MD
yes

## 2023-04-17 ENCOUNTER — TELEPHONE (OUTPATIENT)
Dept: SURGERY | Facility: CLINIC | Age: 58
End: 2023-04-17

## 2023-04-17 NOTE — TELEPHONE ENCOUNTER
Patient is calling to confirmed appointment is for tomorrow or Wednesday I  Informed patient that appointment is showing for Wednesday patient wants to confirmed because she cant make it on Wednesday  She would like to come in tomorrow.

## 2023-04-17 NOTE — TELEPHONE ENCOUNTER
Patient had procedure back on 2/23 and having some pain/bleeding. Please call at [phone],[facility].

## 2023-04-17 NOTE — TELEPHONE ENCOUNTER
Per patient, had a hemorrhoidectomy 2/23/2023, and now is bleeding with each bowel movement, with pain and pressure. States bowels are normal, soft and regular with no constipation or diarrhea. Feels a slight protrusion as if she has a hemorrhoid. Appointment given for Wednesday, April 19, 2023.

## 2023-04-18 ENCOUNTER — OFFICE VISIT (OUTPATIENT)
Dept: SURGERY | Facility: CLINIC | Age: 58
End: 2023-04-18

## 2023-04-18 VITALS — WEIGHT: 134 LBS | BODY MASS INDEX: 22.33 KG/M2 | HEIGHT: 65 IN

## 2023-04-18 DIAGNOSIS — T14.8XXA OPEN WOUND: Primary | ICD-10-CM

## 2023-04-18 PROCEDURE — 99213 OFFICE O/P EST LOW 20 MIN: CPT | Performed by: SURGERY

## 2023-04-18 PROCEDURE — 3008F BODY MASS INDEX DOCD: CPT | Performed by: SURGERY

## 2023-04-18 RX ORDER — HYDROCORTISONE 25 MG/G
1 CREAM TOPICAL 2 TIMES DAILY
Qty: 28 G | Refills: 2 | Status: SHIPPED | OUTPATIENT
Start: 2023-04-18

## 2023-04-18 NOTE — PROGRESS NOTES
Established Patient Follow-up      4/18/2023    Liu Corcoran 63year old      HPI  Patient presents with:  Post-Op: Patient here for post op Hemorrhoidectomy 2-23-23. Patient returns to the office for rectal pain and bleeding. Patient has one BM everyday.       bm soft      Exam  Nl post op with small fissure from post op site      Assessment/Plan  Assessment   Open wound  (primary encounter diagnosis)    anusol  Prn  Warm bath bid  Fu 2 weeks if no better         Alejandra Meza MD

## 2023-04-19 ENCOUNTER — TELEPHONE (OUTPATIENT)
Dept: FAMILY MEDICINE CLINIC | Facility: CLINIC | Age: 58
End: 2023-04-19

## 2023-04-19 NOTE — TELEPHONE ENCOUNTER
Requesting a referral for retinol specialist Reid Keenan [facility] [phone]   Fax number [phone]

## 2023-05-01 NOTE — TELEPHONE ENCOUNTER
Spoke to patient (name and  of patient verified). She states she called a week ago requesting a new referral for ophthalmology appointment on Monday. Patient states she may have used all 3 visits and is unsure if she needs new referral or if active referral is adequate. Patient states she will call ophthalmologist's office and call back if new referral is needed.

## 2023-05-02 ENCOUNTER — TELEPHONE (OUTPATIENT)
Dept: CASE MANAGEMENT | Age: 58
End: 2023-05-02

## 2023-05-02 DIAGNOSIS — H43.399 VITREOUS FLOATERS, UNSPECIFIED LATERALITY: Primary | ICD-10-CM

## 2023-05-02 DIAGNOSIS — H33.319 RETINAL TEAR, UNSPECIFIED LATERALITY: ICD-10-CM

## 2023-05-02 NOTE — TELEPHONE ENCOUNTER
Dr. Mei Donis,     The patient is requesting a referral to Dr. Maryann Nelson. Pended referral please review diagnosis and sign off if you agree. Thank you.   Sky Hernandez

## 2023-05-02 NOTE — TELEPHONE ENCOUNTER
Referral signed [employer] Counseling:  I discussed with the patient the risks of mycophenolate mofetil including but not limited to infection/immunosuppression, GI upset, hypokalemia, hypercholesterolemia, bone marrow suppression, lymphoproliferative disorders, malignancy, GI ulceration/bleed/perforation, colitis, interstitial lung disease, kidney failure, progressive multifocal leukoencephalopathy, and birth defects.  The patient understands that monitoring is required including a baseline creatinine and regular CBC testing. In addition, patient must alert us immediately if symptoms of infection or other concerning signs are noted.

## 2023-05-08 ENCOUNTER — OFFICE VISIT (OUTPATIENT)
Dept: FAMILY MEDICINE CLINIC | Facility: CLINIC | Age: 58
End: 2023-05-08

## 2023-05-08 ENCOUNTER — NURSE TRIAGE (OUTPATIENT)
Dept: FAMILY MEDICINE CLINIC | Facility: CLINIC | Age: 58
End: 2023-05-08

## 2023-05-08 VITALS
SYSTOLIC BLOOD PRESSURE: 132 MMHG | DIASTOLIC BLOOD PRESSURE: 79 MMHG | HEART RATE: 84 BPM | HEIGHT: 65 IN | BODY MASS INDEX: 22.33 KG/M2 | WEIGHT: 134 LBS | TEMPERATURE: 98 F

## 2023-05-08 DIAGNOSIS — R05.1 ACUTE COUGH: Primary | ICD-10-CM

## 2023-05-08 LAB
CONTROL LINE PRESENT WITH A CLEAR BACKGROUND (YES/NO): YES YES/NO
COVID19 BINAX NOW ANTIGEN: DETECTED
KIT LOT #: 5675 NUMERIC
POCT LOT NUMBER: ABNORMAL
STREP GRP A CUL-SCR: NEGATIVE

## 2023-05-08 RX ORDER — CODEINE PHOSPHATE AND GUAIFENESIN 10; 100 MG/5ML; MG/5ML
5 SOLUTION ORAL NIGHTLY PRN
Qty: 118 ML | Refills: 0 | Status: SHIPPED | OUTPATIENT
Start: 2023-05-08

## 2023-05-08 RX ORDER — BENZONATATE 200 MG/1
200 CAPSULE ORAL 3 TIMES DAILY PRN
Qty: 90 CAPSULE | Refills: 0 | Status: SHIPPED | OUTPATIENT
Start: 2023-05-08

## 2023-06-21 ENCOUNTER — OFFICE VISIT (OUTPATIENT)
Dept: FAMILY MEDICINE CLINIC | Facility: CLINIC | Age: 58
End: 2023-06-21

## 2023-06-21 VITALS
WEIGHT: 133 LBS | HEART RATE: 74 BPM | BODY MASS INDEX: 22 KG/M2 | SYSTOLIC BLOOD PRESSURE: 108 MMHG | RESPIRATION RATE: 16 BRPM | DIASTOLIC BLOOD PRESSURE: 72 MMHG | TEMPERATURE: 98 F

## 2023-06-21 DIAGNOSIS — Z02.89 ENCOUNTER FOR PHYSICAL EXAMINATION RELATED TO EMPLOYMENT: Primary | ICD-10-CM

## 2023-06-21 DIAGNOSIS — R05.9 COUGH, UNSPECIFIED TYPE: ICD-10-CM

## 2023-06-21 PROCEDURE — 86580 TB INTRADERMAL TEST: CPT | Performed by: FAMILY MEDICINE

## 2023-06-21 RX ORDER — ALBUTEROL SULFATE 90 UG/1
2 AEROSOL, METERED RESPIRATORY (INHALATION) EVERY 4 HOURS PRN
Qty: 1 EACH | Refills: 1 | Status: SHIPPED | OUTPATIENT
Start: 2023-06-21

## 2023-06-24 ENCOUNTER — NURSE ONLY (OUTPATIENT)
Dept: INTERNAL MEDICINE CLINIC | Facility: CLINIC | Age: 58
End: 2023-06-24

## 2023-06-24 DIAGNOSIS — Z11.1 TUBERCULIN SKIN TEST READING ENCOUNTER: Primary | ICD-10-CM

## 2023-06-24 LAB — INDURATION (): 0 MM (ref 0–11)

## 2023-06-24 NOTE — PROGRESS NOTES
Patient is here for TB read. Name and  verified. TB negative 0.0mm. Advised to f/u w/ PCP for forms.

## 2023-06-26 ENCOUNTER — TELEPHONE (OUTPATIENT)
Dept: FAMILY MEDICINE CLINIC | Facility: CLINIC | Age: 58
End: 2023-06-26

## 2023-06-26 NOTE — TELEPHONE ENCOUNTER
Pt coming to p/u form with TB results, stated it was after 12 Saturday , therefore she went to  to read-Neg , will p/u within next hr

## 2023-07-13 ENCOUNTER — NURSE TRIAGE (OUTPATIENT)
Dept: FAMILY MEDICINE CLINIC | Facility: CLINIC | Age: 58
End: 2023-07-13

## 2023-07-13 ENCOUNTER — OFFICE VISIT (OUTPATIENT)
Dept: INTERNAL MEDICINE CLINIC | Facility: CLINIC | Age: 58
End: 2023-07-13

## 2023-07-13 VITALS
DIASTOLIC BLOOD PRESSURE: 80 MMHG | BODY MASS INDEX: 22.49 KG/M2 | HEART RATE: 76 BPM | HEIGHT: 65 IN | RESPIRATION RATE: 16 BRPM | WEIGHT: 135 LBS | SYSTOLIC BLOOD PRESSURE: 132 MMHG

## 2023-07-13 DIAGNOSIS — N89.8 VAGINAL IRRITATION: Primary | ICD-10-CM

## 2023-07-13 DIAGNOSIS — N89.8 VAGINAL ITCHING: ICD-10-CM

## 2023-07-13 PROCEDURE — 3079F DIAST BP 80-89 MM HG: CPT | Performed by: NURSE PRACTITIONER

## 2023-07-13 PROCEDURE — 3008F BODY MASS INDEX DOCD: CPT | Performed by: NURSE PRACTITIONER

## 2023-07-13 PROCEDURE — 3075F SYST BP GE 130 - 139MM HG: CPT | Performed by: NURSE PRACTITIONER

## 2023-07-13 PROCEDURE — 99213 OFFICE O/P EST LOW 20 MIN: CPT | Performed by: NURSE PRACTITIONER

## 2023-07-13 NOTE — TELEPHONE ENCOUNTER
Action Requested: Summary for Provider     []  Critical Lab, Recommendations Needed  [] Need Additional Advice  []   FYI    []   Need Orders  [] Need Medications Sent to Pharmacy  []  Other     SUMMARY: Per protocol, patient should be seen in the office today or tomorrow. Appointment scheduled. Future Appointments   Date Time Provider Kasey Beckman   7/13/2023  1:40 PM SERGIO Ott     Reason for call: Urinary  Onset: 2 days ago    Patient reports of vaginal symptoms of itching and pain that started 2 days ago. States \"it feels hot there and very uncomfortable\". She also reports of burning sensation with urination. Denies any other complaints at this time and wants to be seen. Patient verbalized understanding and agreed with plan of care. Appointment scheduled as above.      Reason for Disposition   MODERATE-SEVERE itching (i.e., interferes with school, work, or sleep)    Protocols used: Vaginal Symptoms-A-OH

## 2023-07-16 LAB
GENITAL VAGINOSIS SCREEN: NEGATIVE
TRICHOMONAS SCREEN: NEGATIVE

## 2023-10-05 ENCOUNTER — NURSE TRIAGE (OUTPATIENT)
Dept: FAMILY MEDICINE CLINIC | Facility: CLINIC | Age: 58
End: 2023-10-05

## 2023-10-05 ENCOUNTER — HOSPITAL ENCOUNTER (EMERGENCY)
Facility: HOSPITAL | Age: 58
Discharge: HOME | End: 2023-10-05
Attending: EMERGENCY MEDICINE

## 2023-10-05 VITALS
HEIGHT: 65 IN | OXYGEN SATURATION: 98 % | SYSTOLIC BLOOD PRESSURE: 113 MMHG | TEMPERATURE: 97 F | WEIGHT: 130 LBS | HEART RATE: 63 BPM | RESPIRATION RATE: 18 BRPM | BODY MASS INDEX: 21.66 KG/M2 | DIASTOLIC BLOOD PRESSURE: 70 MMHG

## 2023-10-05 DIAGNOSIS — K64.9 HEMORRHOIDS, UNSPECIFIED HEMORRHOID TYPE: ICD-10-CM

## 2023-10-05 DIAGNOSIS — K62.5 RECTAL BLEEDING: Primary | ICD-10-CM

## 2023-10-05 LAB
BASOPHILS # BLD AUTO: 0.04 X10(3) UL (ref 0–0.2)
BASOPHILS NFR BLD AUTO: 0.7 %
DEPRECATED RDW RBC AUTO: 38.2 FL (ref 35.1–46.3)
EOSINOPHIL # BLD AUTO: 0.13 X10(3) UL (ref 0–0.7)
EOSINOPHIL NFR BLD AUTO: 2.1 %
ERYTHROCYTE [DISTWIDTH] IN BLOOD BY AUTOMATED COUNT: 11.9 % (ref 11–15)
HCT VFR BLD AUTO: 35.7 %
HGB BLD-MCNC: 12.5 G/DL
IMM GRANULOCYTES # BLD AUTO: 0.02 X10(3) UL (ref 0–1)
IMM GRANULOCYTES NFR BLD: 0.3 %
LYMPHOCYTES # BLD AUTO: 1.98 X10(3) UL (ref 1–4)
LYMPHOCYTES NFR BLD AUTO: 32.5 %
MCH RBC QN AUTO: 30.9 PG (ref 26–34)
MCHC RBC AUTO-ENTMCNC: 35 G/DL (ref 31–37)
MCV RBC AUTO: 88.4 FL
MONOCYTES # BLD AUTO: 0.53 X10(3) UL (ref 0.1–1)
MONOCYTES NFR BLD AUTO: 8.7 %
NEUTROPHILS # BLD AUTO: 3.39 X10 (3) UL (ref 1.5–7.7)
NEUTROPHILS # BLD AUTO: 3.39 X10(3) UL (ref 1.5–7.7)
NEUTROPHILS NFR BLD AUTO: 55.7 %
PLATELET # BLD AUTO: 318 10(3)UL (ref 150–450)
RBC # BLD AUTO: 4.04 X10(6)UL
WBC # BLD AUTO: 6.1 X10(3) UL (ref 4–11)

## 2023-10-05 PROCEDURE — 99283 EMERGENCY DEPT VISIT LOW MDM: CPT

## 2023-10-05 PROCEDURE — 85025 COMPLETE CBC W/AUTO DIFF WBC: CPT | Performed by: EMERGENCY MEDICINE

## 2023-10-05 PROCEDURE — 36415 COLL VENOUS BLD VENIPUNCTURE: CPT

## 2023-10-05 RX ORDER — HYDROCORTISONE ACETATE 25 MG/1
25 SUPPOSITORY RECTAL 2 TIMES DAILY PRN
Qty: 24 SUPPOSITORY | Refills: 1 | Status: SHIPPED | OUTPATIENT
Start: 2023-10-05 | End: 2023-11-04

## 2023-10-05 NOTE — ED INITIAL ASSESSMENT (HPI)
Patient arrives from home with reports of bright, red blood in stool x 1 week.  Hx of hemorrhoids with surgical removal.

## 2023-10-05 NOTE — TELEPHONE ENCOUNTER
Please reply to pool: EM RN TRIAGE  Action Requested: Summary for Provider     []  Critical Lab, Recommendations Needed  [] Need Additional Advice  [x]   FYI    []   Need Orders  [] Need Medications Sent to Pharmacy  []  Other     SUMMARY: Patient called states she has had a lot a blood in the stool. Her last BM was this morning, she did see some blood in the stool this morning, formed, it turned the toilet water red as with previous BMs. Denies any lightheadedness and dizziness. She has an unsettling feeling in the abdomen, generalized. She has also experienced a sharp pain in the her pelvis --> 3/10 when defecating and at times intermittent. Denies any vaginal bleeding. Denies any fever. She reports she had a hemorrhoidectomy in February this year. Denies any external hemorrhoids now. She was advised to go to ED now. She was agreeable and will go to [facility] ED now. Patient verbalized understanding. No further questions or concerns at this time.     Reason for call: Blood In Stool  Onset: about 1 week ago    Reason for Disposition   MODERATE rectal bleeding (small blood clots, passing blood without stool, or toilet water turns red) more than once a day    Protocols used: Rectal Bleeding-A-OH

## 2023-10-05 NOTE — ED QUICK NOTES
Pt ambulatory to ED tx room 43 from triage. Pt A&O x 4, answering all ?s appropriately. Pt connected to cont pulse ox & BP. Pt here w/ c/o: Rectal bleeding. Pt reporting BRBPR x 1 week, as well as clots. Pt w/ hx hemorrhoids and hemorrhoidectomy in Feb 2023. Pt reporting blood when wiping and in stool. Pt denies any chest pain, BESSY, n/v/d/c, dysuria, dizziness/lightheadedness, fevers/chills. Cart in low/locked position, side rails up x 2, call light w/ in reach.

## 2023-11-27 ENCOUNTER — ORDER TRANSCRIPTION (OUTPATIENT)
Dept: ADMINISTRATIVE | Facility: HOSPITAL | Age: 58
End: 2023-11-27

## 2023-11-27 DIAGNOSIS — Z12.31 ENCOUNTER FOR SCREENING MAMMOGRAM FOR MALIGNANT NEOPLASM OF BREAST: Primary | ICD-10-CM

## 2024-02-01 ENCOUNTER — HOSPITAL ENCOUNTER (OUTPATIENT)
Dept: MAMMOGRAPHY | Facility: HOSPITAL | Age: 59
Discharge: HOME | End: 2024-02-01
Attending: FAMILY MEDICINE
Payer: COMMERCIAL

## 2024-02-01 DIAGNOSIS — Z12.31 ENCOUNTER FOR SCREENING MAMMOGRAM FOR MALIGNANT NEOPLASM OF BREAST: ICD-10-CM

## 2024-02-01 PROCEDURE — 77067 SCR MAMMO BI INCL CAD: CPT | Performed by: FAMILY MEDICINE

## 2024-02-01 PROCEDURE — 77063 BREAST TOMOSYNTHESIS BI: CPT | Performed by: FAMILY MEDICINE

## 2024-02-14 ENCOUNTER — OFFICE VISIT (OUTPATIENT)
Dept: FAMILY MEDICINE CLINIC | Facility: CLINIC | Age: 59
End: 2024-02-14

## 2024-02-14 VITALS
HEIGHT: 64.76 IN | TEMPERATURE: 98 F | RESPIRATION RATE: 16 BRPM | WEIGHT: 134.63 LBS | BODY MASS INDEX: 22.71 KG/M2 | HEART RATE: 86 BPM | DIASTOLIC BLOOD PRESSURE: 69 MMHG | SYSTOLIC BLOOD PRESSURE: 110 MMHG

## 2024-02-14 DIAGNOSIS — Z00.00 ROUTINE PHYSICAL EXAMINATION: Primary | ICD-10-CM

## 2024-02-14 DIAGNOSIS — R07.89 CHEST DISCOMFORT: ICD-10-CM

## 2024-02-14 LAB
ALBUMIN SERPL-MCNC: 4.6 G/DL (ref 3.2–4.8)
ALBUMIN/GLOB SERPL: 1.8 {RATIO} (ref 1–2)
ALP LIVER SERPL-CCNC: 74 U/L
ALT SERPL-CCNC: 12 U/L
ANION GAP SERPL CALC-SCNC: 5 MMOL/L (ref 0–18)
AST SERPL-CCNC: 21 U/L (ref ?–34)
BILIRUB SERPL-MCNC: 0.4 MG/DL (ref 0.3–1.2)
BUN BLD-MCNC: 12 MG/DL (ref 9–23)
BUN/CREAT SERPL: 16.7 (ref 10–20)
CALCIUM BLD-MCNC: 9.8 MG/DL (ref 8.7–10.4)
CHLORIDE SERPL-SCNC: 105 MMOL/L (ref 98–112)
CHOLEST SERPL-MCNC: 186 MG/DL (ref ?–200)
CO2 SERPL-SCNC: 27 MMOL/L (ref 21–32)
CREAT BLD-MCNC: 0.72 MG/DL
DEPRECATED RDW RBC AUTO: 37.2 FL (ref 35.1–46.3)
EGFRCR SERPLBLD CKD-EPI 2021: 97 ML/MIN/1.73M2 (ref 60–?)
ERYTHROCYTE [DISTWIDTH] IN BLOOD BY AUTOMATED COUNT: 11.6 % (ref 11–15)
EST. AVERAGE GLUCOSE BLD GHB EST-MCNC: 120 MG/DL (ref 68–126)
FASTING PATIENT LIPID ANSWER: NO
FASTING STATUS PATIENT QL REPORTED: NO
GLOBULIN PLAS-MCNC: 2.6 G/DL (ref 2.8–4.4)
GLUCOSE BLD-MCNC: 86 MG/DL (ref 70–99)
HBA1C MFR BLD: 5.8 % (ref ?–5.7)
HCT VFR BLD AUTO: 35.8 %
HDLC SERPL-MCNC: 58 MG/DL (ref 40–59)
HGB BLD-MCNC: 12.8 G/DL
LDLC SERPL CALC-MCNC: 113 MG/DL (ref ?–100)
MCH RBC QN AUTO: 31.6 PG (ref 26–34)
MCHC RBC AUTO-ENTMCNC: 35.8 G/DL (ref 31–37)
MCV RBC AUTO: 88.4 FL
NONHDLC SERPL-MCNC: 128 MG/DL (ref ?–130)
OSMOLALITY SERPL CALC.SUM OF ELEC: 283 MOSM/KG (ref 275–295)
PLATELET # BLD AUTO: 320 10(3)UL (ref 150–450)
POTASSIUM SERPL-SCNC: 3.9 MMOL/L (ref 3.5–5.1)
PROT SERPL-MCNC: 7.2 G/DL (ref 5.7–8.2)
RBC # BLD AUTO: 4.05 X10(6)UL
SODIUM SERPL-SCNC: 137 MMOL/L (ref 136–145)
TRIGL SERPL-MCNC: 83 MG/DL (ref 30–149)
TSI SER-ACNC: 2.64 MIU/ML (ref 0.55–4.78)
VLDLC SERPL CALC-MCNC: 14 MG/DL (ref 0–30)
WBC # BLD AUTO: 7.4 X10(3) UL (ref 4–11)

## 2024-02-14 PROCEDURE — 3008F BODY MASS INDEX DOCD: CPT | Performed by: FAMILY MEDICINE

## 2024-02-14 PROCEDURE — 99396 PREV VISIT EST AGE 40-64: CPT | Performed by: FAMILY MEDICINE

## 2024-02-14 PROCEDURE — 3078F DIAST BP <80 MM HG: CPT | Performed by: FAMILY MEDICINE

## 2024-02-14 PROCEDURE — 3074F SYST BP LT 130 MM HG: CPT | Performed by: FAMILY MEDICINE

## 2024-02-14 RX ORDER — PYRIDOXINE HCL (VITAMIN B6) 50 MG
TABLET ORAL
COMMUNITY

## 2024-02-14 NOTE — PROGRESS NOTES
HPI:  59 yr old female who present for physical. 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   with 2 kids. 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 Exercising.     Gets chest pain when she is stressed. Heart races a lot and gets palpitations. States heart races all the time. Saw Dr. Springer in the past but would like to see someone new. Gets symptoms during exercise at times.     PMHx: reviewed, see chart  PSHx: reviewed, see chart  All: Peanuts, Leonardo, Garlic, and Eggs or egg-derived products   Meds: see chart    ROS:   Allergic/Immuno:  Negative for environmental allergies and food allergies  Cardiovascular:  positive for chest discomfort  Constitutional:  Negative for decreased activity, fever, irritability and lethargy  Endocrine:  Negative for abnormal sleep patterns, increased activity, polydipsia and polyphagia  ENMT:  Negative for ear drainage, hearing loss and nasal drainage  Eyes:  Negative for eye discharge and vision loss  Gastrointestinal:  Negative for abdominal pain, constipation, decreased appetite, diarrhea and vomiting  Genitourinary:  Negative for dysuria and hematuria  Hema/Lymph:  Negative for easy bleeding and easy bruising  Integumentary:  Negative for pruritus and rash  Musculoskeletal:  Negative for bone/joint symptoms  Neurological:  Negative for gait disturbance  Psychiatric:  Negative for inappropriate interaction and psychiatric symptoms    PE:  /69   Pulse 86   Temp 98.3 °F (36.8 °C) (Oral)   Resp 16   Ht 5' 4.76\" (1.645 m)   Wt 134 lb 9.6 oz (61.1 kg)   LMP 03/20/2017   BMI 22.56 kg/m²   /69   Pulse 86   Temp 98.3 °F (36.8 °C) (Oral)   Resp 16   Ht 5' 4.76\" (1.645 m)   Wt 134 lb 9.6 oz (61.1 kg)   LMP 03/20/2017   BMI 22.56 kg/m²   Gen:  Well-nourished.  No distress.  HEENT: Conjunctive clear.  [place] ear canals clear.  [place] TMs intact with good landmarks noted.  Nares patent.  Oral mucous membrane moist.  Normal lips, teeth, and gums.  Oropharynx normal.  Neck supple.  Good  ROM.  No LAD.  Thyroid normal.  CV:  Regular rate and rhythm; no murmurs  Lungs:  Clear to ausculation; good aeration               No wheezes, rales or rhonchi  Abd: soft, non-tender, non-distended          Normal bowel sounds; no masses          No hepatosplenomegaly  Extremities: No cyanosis, clubbing, edema.  Pedal pulses 2+ nicolas.  MSK:  No abnormalities.  Skin: Warm/dry/intact.  No abnormal moles/lesions noted.  No rashes.    A/P:  Encounter Diagnoses   Name Primary?    Routine physical examination    Doing well.  Exercises and eating healthy.  Labs done.    Yes    Chest discomfort    Pain has been persistent and happens during exercise now.  Feels like heart races.  Will get 2D echo and refer to Cardiology.       Colleen Weiler, DO

## 2024-04-05 ENCOUNTER — OFFICE VISIT (OUTPATIENT)
Dept: FAMILY MEDICINE CLINIC | Facility: CLINIC | Age: 59
End: 2024-04-05

## 2024-04-05 VITALS
DIASTOLIC BLOOD PRESSURE: 72 MMHG | HEIGHT: 64.76 IN | BODY MASS INDEX: 22.94 KG/M2 | SYSTOLIC BLOOD PRESSURE: 118 MMHG | HEART RATE: 85 BPM | WEIGHT: 136 LBS

## 2024-04-05 DIAGNOSIS — N30.00 ACUTE CYSTITIS WITHOUT HEMATURIA: Primary | ICD-10-CM

## 2024-04-05 DIAGNOSIS — R30.0 DYSURIA: ICD-10-CM

## 2024-04-05 LAB
APPEARANCE: CLEAR
BILIRUBIN: NEGATIVE
GLUCOSE (URINE DIPSTICK): NEGATIVE MG/DL
KETONES (URINE DIPSTICK): NEGATIVE MG/DL
MULTISTIX LOT#: ABNORMAL NUMERIC
NITRITE, URINE: NEGATIVE
PH, URINE: 6 (ref 4.5–8)
PROTEIN (URINE DIPSTICK): NEGATIVE MG/DL
SPECIFIC GRAVITY: 1.05 (ref 1–1.03)
URINE-COLOR: YELLOW
UROBILINOGEN,SEMI-QN: 0.2 MG/DL (ref 0–1.9)

## 2024-04-05 PROCEDURE — 87186 SC STD MICRODIL/AGAR DIL: CPT | Performed by: PHYSICIAN ASSISTANT

## 2024-04-05 PROCEDURE — 87088 URINE BACTERIA CULTURE: CPT | Performed by: PHYSICIAN ASSISTANT

## 2024-04-05 PROCEDURE — 87086 URINE CULTURE/COLONY COUNT: CPT | Performed by: PHYSICIAN ASSISTANT

## 2024-04-05 RX ORDER — NITROFURANTOIN 25; 75 MG/1; MG/1
100 CAPSULE ORAL 2 TIMES DAILY
Qty: 10 CAPSULE | Refills: 0 | Status: SHIPPED | OUTPATIENT
Start: 2024-04-05 | End: 2024-04-10

## 2024-04-05 NOTE — PROGRESS NOTES
HPI:     Dysuria   This is a new problem. Episode onset: 4 days. The problem occurs every urination. The problem has been gradually worsening. The quality of the pain is described as burning and aching. There has been no fever. There is No history of pyelonephritis. Associated symptoms include frequency and hematuria. Pertinent negatives include no chills, discharge, flank pain, nausea or vomiting.       Medications:     Current Outpatient Medications   Medication Sig Dispense Refill    nitrofurantoin monohydrate macro 100 MG Oral Cap Take 1 capsule (100 mg total) by mouth 2 (two) times daily for 5 days. 10 capsule 0    Omega-3 Fatty Acids (FISH OIL OR) Take by mouth.      TURMERIC OR Take by mouth.      Folic Acid (FOLATE OR) Take by mouth.      Cyanocobalamin (B-12) 50 MCG Oral Tab Take by mouth.      Calcium 200 MG Oral Tab Take by mouth.      NON FORMULARY       Ergocalciferol (VITAMIN D OR) Take by mouth.         Allergies:     Allergies   Allergen Reactions    Peanuts UNKNOWN     NUTS    [place] NAUSEA ONLY    Garlic NAUSEA ONLY    Eggs Or Egg-Derived Products NAUSEA ONLY       History:     Health Maintenance   Topic Date Due    Zoster Vaccines (2 of 2) 2024    Mammogram  2025    DTaP,Tdap,and Td Vaccines (2 - Td or Tdap) 02/10/2025    Annual Physical  2025    Pap Smear  2026    Colorectal Cancer Screening  2027    Influenza Vaccine  Completed    Annual Depression Screening  Completed    COVID-19 Vaccine  Completed    Pneumococcal Vaccine: Birth to 64yrs  Aged Out       Patient's last menstrual period was 2017.   Past Medical History:     Past Medical History:   Diagnosis Date    Anxiety     Chronic rhinitis     Depression     DUB (dysfunctional uterine bleeding) 2014    Genital herpes     Visual impairment        Past Surgical History:     Past Surgical History:   Procedure Laterality Date    Appendectomy  2002    Appendectomy             delivery  following neuraxial labor analgesia/a      Colonoscopy N/A 2020    Procedure: COLONOSCOPY;  Surgeon: Donavan Ponce MD;  Location: [facility] ENDOSCOPY    Hemorrhoidectomy  2023       Family History:     Family History   Problem Relation Age of Onset    Other (Other) Mother     Diabetes Mother     Hypertension Mother     Cancer Father         per [employer]:  \"Cancer - esophageal\"    Diabetes Father          at age 56    Other (Other) Sister         sarcoidosis    Cancer Paternal Grandmother         per [employer]:  \"Cancer - ovarian, age 60 (cause of death)\"    Ovarian Cancer Paternal Grandmother 60    Cancer Maternal Aunt         per [employer]:  \"Cancer - breast, (cause of death)\"    Breast Cancer Maternal Aunt 43        cause of death    Breast Cancer Maternal Cousin Female 50    Depression Other         per [employer]:  \"Family h/o Depression\"    Hypertension Other         per [employer]:  \"Family h/o Hypertension\"       Social History:     Social History     Socioeconomic History    Marital status:      Spouse name: Not on file    Number of children: Not on file    Years of education: Not on file    Highest education level: Not on file   Occupational History    Not on file   Tobacco Use    Smoking status: Never    Smokeless tobacco: Never   Vaping Use    Vaping Use: Never used   Substance and Sexual Activity    Alcohol use: Yes     Comment: (beer & wine) occasionally    Drug use: Yes     Types: Cannabis    Sexual activity: Not on file   Other Topics Concern     Service Not Asked    Blood Transfusions Not Asked    Caffeine Concern Yes     Comment: Coffee, 2 cups daily    Occupational Exposure Not Asked    Hobby Hazards Not Asked    Sleep Concern Not Asked    Stress Concern Not Asked    Weight Concern Not Asked    Special Diet Not Asked    Back Care Not Asked    Exercise Yes     Comment: 4 x weekly - swimming, running, biking    Bike Helmet Not Asked    Seat Belt Not Asked    Self-Exams Not Asked     Grew up on a farm Not Asked    History of tanning Not Asked    Outdoor occupation Not Asked    Pt has a pacemaker No    Pt has a defibrillator No    Breast feeding Not Asked    Reaction to local anesthetic No   Social History Narrative    The patient does not use an assistive device..      The patient does live in a home with stairs.     Social Determinants of Health     Financial Resource Strain: Not on file   Food Insecurity: Not on file   Transportation Needs: Not on file   Physical Activity: Not on file   Stress: Not on file   Social Connections: Not on file   Housing Stability: Not on file       Review of Systems:   Review of Systems   Constitutional:  Negative for activity change, chills, fatigue and fever.   HENT:  Negative for congestion, ear discharge, ear pain, postnasal drip, rhinorrhea, sinus pressure, sinus pain and sore throat.    Respiratory:  Negative for cough, chest tightness, shortness of breath and wheezing.    Cardiovascular:  Negative for chest pain and palpitations.   Gastrointestinal:  Negative for abdominal distention, abdominal pain, blood in stool, constipation, diarrhea, nausea and vomiting.   Genitourinary:  Positive for dysuria, frequency and hematuria. Negative for flank pain.   Skin:  Negative for rash.        Vitals:    04/05/24 1322   BP: 118/72   Pulse: 85   Weight: 136 lb (61.7 kg)   Height: 5' 4.76\" (1.645 m)     Body mass index is 22.8 kg/m².    Physical Exam:   Physical Exam  Vitals reviewed.   Constitutional:       General: She is not in acute distress.     Appearance: She is well-developed.   HENT:      Head: Normocephalic and atraumatic.      Right Ear: External ear normal.      Left Ear: External ear normal.      Nose: Nose normal.   Eyes:      General:         Right eye: No discharge.         Left eye: No discharge.      Conjunctiva/sclera: Conjunctivae normal.   Cardiovascular:      Rate and Rhythm: Normal rate and regular rhythm.      Heart sounds: Normal heart sounds,  S1 normal and S2 normal. No murmur heard.  Pulmonary:      Effort: Pulmonary effort is normal.      Breath sounds: Normal breath sounds. No wheezing or rales.   Chest:      Chest wall: No tenderness.   Abdominal:      General: Abdomen is flat. Bowel sounds are normal. There is no distension.      Palpations: Abdomen is soft.      Tenderness: There is abdominal tenderness in the suprapubic area. There is no right CVA tenderness or left CVA tenderness.   Lymphadenopathy:      Cervical: No cervical adenopathy.   Skin:     Findings: No rash.   Neurological:      Mental Status: She is alert and oriented to person, place, and time.   Psychiatric:         Behavior: Behavior is cooperative.        Assessment and Plan::     Problem List Items Addressed This Visit    None  Visit Diagnoses       Acute cystitis without hematuria    -  Primary    Relevant Medications    nitrofurantoin monohydrate macro 100 MG Oral Cap    Dysuria        Relevant Orders    POC Urinalysis, Automated Dip without microscopy (PCA and EMMG ONLY) [76288] (Completed)    Urine Culture, Routine [E]        Complete full course of antibiotics.  Will send the urine for culture and we will call with results.  Over the counter Tylenol/Motrin as needed for pain/discomfort.  Follow up in 3-4 days if symptoms do not improve or worsen.    Return to clinic or see your primary care provider immediately if you experience nausea, vomiting, or fever.     Discussed plan of care with pt and pt is in agreement.All questions answered. Pt to call with questions or concerns.

## 2024-04-10 RX ORDER — VALACYCLOVIR HYDROCHLORIDE 500 MG/1
500 TABLET, FILM COATED ORAL 2 TIMES DAILY
Qty: 30 TABLET | Refills: 1 | Status: SHIPPED | OUTPATIENT
Start: 2024-04-10

## 2024-04-10 NOTE — TELEPHONE ENCOUNTER
Refill passed per [facility] protocol.    Last prescribed in 2022, patient requesting is refill appropriate?    Requested Prescriptions   Pending Prescriptions Disp Refills    valACYclovir 500 MG Oral Tab 30 tablet 1     Sig: Take 1 tablet (500 mg total) by mouth 2 (two) times daily.       Herpes Agent Protocol Passed - 4/10/2024 12:25 PM        Passed - In person appointment or virtual visit in the past 12 mos or appointment in next 3 mos     Recent Outpatient Visits              5 [facility] ago [facility]    Office Visit    1 month ago Routine physical examination    [facility] Weiler, Colleen M,     Office Visit    9 months ago [facility]aginal irritation    [facility] [place] [facility]    Office Visit    9 months ago Tuberculin skin test reading encounter    [facility] [place]    Nurse Only    9 months ago Encounter for physical examination related to employment    [facility] Weiler, Colleen M, DO    Office Visit                           Recent Outpatient Visits              5 days ago Acute [facility]stitis without hematuria    [facility]    Office Visit    1 month ago Routine physical examination    [facility] Weiler, Colleen M, DO    Office Visit    9 months ago Vaginal irritation    [facility] [place] [facility]    Office Visit    9 months ago Tuberculin skin test reading encounter    [facility] [place]    Nurse Only    9 months ago Encounter for physical examination related to employment    [facility] Weiler, Colleen M, DO     Office Visit

## 2024-04-10 NOTE — TELEPHONE ENCOUNTER
Pt asking Dr Weiler to fill Rx:    Valcyclovir    Send to:  [id number] - [place], [place] - [address] [phone], [phone] [address] [address]

## 2024-06-04 ENCOUNTER — TELEPHONE (OUTPATIENT)
Dept: FAMILY MEDICINE CLINIC | Facility: CLINIC | Age: 59
End: 2024-06-04

## 2024-06-04 ENCOUNTER — OFFICE VISIT (OUTPATIENT)
Dept: INTERNAL MEDICINE CLINIC | Facility: CLINIC | Age: 59
End: 2024-06-04
Payer: COMMERCIAL

## 2024-06-04 VITALS
DIASTOLIC BLOOD PRESSURE: 78 MMHG | SYSTOLIC BLOOD PRESSURE: 121 MMHG | HEIGHT: 64 IN | BODY MASS INDEX: 23.22 KG/M2 | RESPIRATION RATE: 16 BRPM | WEIGHT: 136 LBS | HEART RATE: 76 BPM

## 2024-06-04 DIAGNOSIS — H61.23 BILATERAL IMPACTED CERUMEN: ICD-10-CM

## 2024-06-04 DIAGNOSIS — R05.8 DRY COUGH: Primary | ICD-10-CM

## 2024-06-04 DIAGNOSIS — R12 HEARTBURN: ICD-10-CM

## 2024-06-04 PROCEDURE — 3074F SYST BP LT 130 MM HG: CPT | Performed by: NURSE PRACTITIONER

## 2024-06-04 PROCEDURE — 3078F DIAST BP <80 MM HG: CPT | Performed by: NURSE PRACTITIONER

## 2024-06-04 PROCEDURE — 99213 OFFICE O/P EST LOW 20 MIN: CPT | Performed by: NURSE PRACTITIONER

## 2024-06-04 PROCEDURE — 3008F BODY MASS INDEX DOCD: CPT | Performed by: NURSE PRACTITIONER

## 2024-06-04 NOTE — TELEPHONE ENCOUNTER
[facility]    Persistent cough, intermittent heart burn, continued inconsistent pain in chest on left side

## 2024-06-04 NOTE — PATIENT INSTRUCTIONS
Follow a bland diet     Try not to eat at least 2 hours prior to bed    Have the chest x-ray completed     May benefit from starting an acid reduction medication such as Protonix will wait for chest x-ray results

## 2024-06-04 NOTE — TELEPHONE ENCOUNTER
Spoke to patient , advised need earlier appointment with symptoms, made for today, will keep previous a for follow up

## 2024-06-04 NOTE — PROGRESS NOTES
Steph Aguilar is a 59 year old female.  Chief Complaint   Patient presents with    Cough     4-5 months    Heartburn     HPI:   She presents with a dry cough and indigestion. The cough does not worsen at night time. The cough does worsen when she lays down.     She had a dry cough for about 4-5 months.   Her oxygen level is 97% is on room air. She denies any SOB.     She had indigestion for about 4 days ago. She felt a burning sensation in her throat. The symptoms have now resolved. She states she eats plain. No spicy foods.     She has had episodes of chest discomfort and is following with cardiology.       Current Outpatient Medications   Medication Sig Dispense Refill    Omega-3 Fatty Acids (FISH OIL OR) Take by mouth.      TURMERIC OR Take by mouth.      Folic Acid (FOLATE OR) Take by mouth.      Cyanocobalamin (B-12) 50 MCG Oral Tab Take by mouth.      Calcium 200 MG Oral Tab Take by mouth.      NON FORMULARY       Ergocalciferol (VITAMIN D OR) Take by mouth.        Past Medical History:    Anxiety    Chronic rhinitis    Depression    DUB (dysfunctional uterine bleeding)    Genital herpes    Visual impairment      Past Surgical History:   Procedure Laterality Date    Appendectomy  2002    Appendectomy             delivery following neuraxial labor analgesia/a      Colonoscopy N/A 2020    Procedure: COLONOSCOPY;  Surgeon: Donavan Ponce MD;  Location: [facility] ENDOSCOPY    Hemorrhoidectomy  2023      Social History:  Social History     Socioeconomic History    Marital status:    Tobacco Use    Smoking status: Never    Smokeless tobacco: Never   Vaping Use    Vaping status: Never Used   Substance and Sexual Activity    Alcohol use: Yes     Comment: (beer & wine) occasionally    Drug use: Yes     Types: Cannabis   Other Topics Concern    Caffeine Concern Yes     Comment: Coffee, 2 cups daily    Exercise Yes     Comment: 4 x weekly - swimming, running, biking    Pt has a  pacemaker No    Pt has a defibrillator No    Reaction to local anesthetic No   Social History Narrative    The patient does not use an assistive device..      The patient does live in a home with stairs.     Social Determinants of Health      [facility]    Social Connections    [facility]    Housing Stability      Family History   Problem Relation Age of Onset    Other (Other) Mother     Diabetes Mother     Hypertension Mother     Cancer Father         per [employer]:  \"Cancer - esophageal\"    Diabetes Father          at age 56    Other (Other) Sister         sarcoidosis    Cancer Paternal Grandmother         per [employer]:  \"Cancer - ovarian, age 60 (cause of death)\"    Ovarian Cancer Paternal Grandmother 60    Cancer Maternal Aunt         per [employer]:  \"Cancer - breast, (cause of death)\"    Breast Cancer Maternal Aunt 43        cause of death    Breast Cancer Maternal Cousin Female 50    Depression Other         per [employer]:  \"Family h/o Depression\"    Hypertension Other         per [employer]:  \"Family h/o Hypertension\"      Allergies   Allergen Reactions    Peanuts UNKNOWN     NUTS    [place] NAUSEA ONLY    Garlic NAUSEA ONLY    Eggs Or Egg-Derived Products NAUSEA ONLY        REVIEW OF SYSTEMS:     Review of Systems   Constitutional:  Negative for fever.   HENT: Negative.     Respiratory:  Positive for cough. Negative for shortness of breath and wheezing.    Cardiovascular:  Negative for chest pain.   Gastrointestinal: Negative.  Negative for abdominal pain.        Indigestion symptoms x 4 days which have now resolved   Genitourinary: Negative.    Musculoskeletal: Negative.    Skin: Negative.    Neurological: Negative.    Psychiatric/Behavioral: Negative.        Wt Readings from Last 5 Encounters:   24 136 lb (61.7 kg)   24 136 lb (61.7 kg)   24 134 lb 9.6 oz (61.1 kg)    10/05/23 130 lb (59 kg)   07/13/23 135 lb (61.2 kg)     Body mass index is 23.34 kg/m².      EXAM:   /78   Pulse 76   Resp 16   Ht 5' 4\" (1.626 m)   Wt 136 lb (61.7 kg)   LMP 03/20/2017   BMI 23.34 kg/m²     Physical Exam  Vitals reviewed.   Constitutional:       Appearance: Normal appearance.   HENT:      Head: Normocephalic.      Right Ear: There is impacted cerumen.      Left Ear: There is impacted cerumen.   Cardiovascular:      Rate and Rhythm: Normal rate and regular rhythm.      Pulses: Normal pulses.   Pulmonary:      Breath sounds: Normal breath sounds. No wheezing.   Musculoskeletal:         General: No swelling. Normal range of motion.   Skin:     General: Skin is warm and dry.   Neurological:      Mental Status: She is alert and oriented to person, place, and time.   Psychiatric:         Mood and Affect: Mood normal.         Behavior: Behavior normal.            ASSESSMENT AND PLAN:   1. Dry cough  - could be related to heartburn   - will check chest x-ray  - XR CHEST PA + LAT CHEST (CPT=71046); Future    2. Heartburn  - dw patient about starting PPI  - her symptoms have resolved but could be the cause of the dry cough.   - will await x-ray results prior to starting PPI    3. Bilateral impacted cerumen  - purchase OTC debrox wax removal drops  - follow up with ENT if no resolution       The patient indicates understanding of these issues and agrees to the plan.  Return for if symptoms do not resolve.

## 2024-06-05 ENCOUNTER — HOSPITAL ENCOUNTER (OUTPATIENT)
Dept: GENERAL RADIOLOGY | Age: 59
Discharge: HOME | End: 2024-06-05
Attending: NURSE PRACTITIONER
Payer: COMMERCIAL

## 2024-06-05 DIAGNOSIS — R05.8 DRY COUGH: ICD-10-CM

## 2024-06-05 PROCEDURE — 71046 X-RAY EXAM CHEST 2 VIEWS: CPT | Performed by: NURSE PRACTITIONER

## 2024-06-06 ENCOUNTER — PATIENT MESSAGE (OUTPATIENT)
Dept: INTERNAL MEDICINE CLINIC | Facility: CLINIC | Age: 59
End: 2024-06-06

## 2024-06-10 NOTE — TELEPHONE ENCOUNTER
From: Steph Aguilar  To: Ailyn Cat  Sent: 6/6/2024 3:44 PM CDT  Subject: Chest X-ray    Hi,  I got your message and was wondering if I should be concerned about the mild tortuosity of the thoracic aorta? I’m not sure if that is common or what it means and if it might explain the pain in my chest.  Thanks,   Arely

## 2024-07-09 ENCOUNTER — OFFICE VISIT (OUTPATIENT)
Dept: FAMILY MEDICINE CLINIC | Facility: CLINIC | Age: 59
End: 2024-07-09
Payer: COMMERCIAL

## 2024-07-09 VITALS
BODY MASS INDEX: 23 KG/M2 | TEMPERATURE: 98 F | RESPIRATION RATE: 16 BRPM | SYSTOLIC BLOOD PRESSURE: 113 MMHG | HEART RATE: 66 BPM | DIASTOLIC BLOOD PRESSURE: 74 MMHG | WEIGHT: 134 LBS

## 2024-07-09 DIAGNOSIS — M41.9 SCOLIOSIS OF THORACIC SPINE, UNSPECIFIED SCOLIOSIS TYPE: ICD-10-CM

## 2024-07-09 DIAGNOSIS — H92.01 DISCOMFORT OF RIGHT EAR: ICD-10-CM

## 2024-07-09 DIAGNOSIS — R07.89 CHEST DISCOMFORT: Primary | ICD-10-CM

## 2024-07-09 PROCEDURE — 3078F DIAST BP <80 MM HG: CPT | Performed by: FAMILY MEDICINE

## 2024-07-09 PROCEDURE — 99214 OFFICE O/P EST MOD 30 MIN: CPT | Performed by: FAMILY MEDICINE

## 2024-07-09 PROCEDURE — 3074F SYST BP LT 130 MM HG: CPT | Performed by: FAMILY MEDICINE

## 2024-07-09 NOTE — PROGRESS NOTES
HPI: Steph is a 59 year old female who presents for follow-up chest pressure.  Pt had cardiac testing and saw cardiologist. Pain is on the left chest wall. Pain can be radiating. Lasts about 1-2 minutes.  Intermittent. Happens about 4-5 times per day.     Having right ear discomfort. Uses Q tips.  Had more pain the last few days. Hearing was decreased for a few days.     PMH:    Past Medical History:    Anxiety    Chronic rhinitis    Depression    DUB (dysfunctional uterine bleeding)    Genital herpes    Visual impairment      Alg:  Peanuts, [place], Garlic, and Eggs or egg-derived products   Meds:   Current Outpatient Medications on File Prior to Visit   Medication Sig Dispense Refill    Omega-3 Fatty Acids (FISH OIL OR) Take by mouth.      TURMERIC OR Take by mouth.      Folic Acid (FOLATE OR) Take by mouth.      Cyanocobalamin (B-12) 50 MCG Oral Tab Take by mouth.      Calcium 200 MG Oral Tab Take by mouth.      NON FORMULARY       Ergocalciferol (VITAMIN D OR) Take by mouth.       No current facility-administered medications on file prior to visit.      Tobacco Use: no    Objective:   Gen: AOx3. NAD.  /74   Pulse 66   Temp 97.9 °F (36.6 °C) (Temporal)   Resp 16   Wt 134 lb (60.8 kg)   LMP 03/20/2017   BMI 23.00 kg/m²   Right ear canal- mild cerumen noted.  Small abrasion noted to lateral ear canal.  TM clear with good landmarks  Left ear- ear canal clear.  TM clear with good landmarks    Assessment:/Plan:  Encounter Diagnoses   Name Primary?    Chest discomfort Yes    Scoliosis of thoracic spine, unspecified scoliosis type         Refer to physical therapy as testing has been normal.  ?MSK.  Will see chiropractor and get physical therapy.     Discomfort of right ear    May be due to small abrasion on ear canal.  Advised to keep clean.  Call if pain worsens and will send antibiotic drop in.     Colleen Weiler, DO

## 2024-08-30 ENCOUNTER — NURSE TRIAGE (OUTPATIENT)
Dept: FAMILY MEDICINE CLINIC | Facility: CLINIC | Age: 59
End: 2024-08-30

## 2024-08-30 NOTE — TELEPHONE ENCOUNTER
Action Requested: Summary for Provider     []  Critical Lab, Recommendations Needed  [] Need Additional Advice  []   FYI    []   Need Orders  [] Need Medications Sent to Pharmacy  []  Other     SUMMARY:     The patient will proceed to the urgent care.  She is also asking if she should follow up with Dr. Weiler?    The patient stated for a couple of days she has been having dizziness.  Yesterday she had a headache today her head feels \"heavy\" .  She also had the chills yesterday.  She took a covid test which was negative.  Today she did have to rest for a while for the dizziness was bad.  Denies any ear issues.  She has been drinking fluids.   The patient did exercise today.  Now she has groin 4/10 pain.    Dr. Weiler is out of the office routed to Dr. Crenshaw.    Reason for call: Dizziness  Onset: Data Unavailable      General Assessment (questions to ask the caller)  Do you consider this a medical emergency?: No  Can you access 911?: Yes  Do you have any pain?: Yes  What is the severity of your pain? (Scale 1-10): 4 (groin area)  Do you have a fever?: No  What is the date of your last medical exam?: 07/09/24  Additional Assessments  Are these symptoms new, recurrent, or chronic?: new (couple of days)        Reason for Disposition   Lightheadedness (dizziness) present now, after 2 hours of rest and fluids    Protocols used: Dizziness-A-OH

## 2024-08-30 NOTE — TELEPHONE ENCOUNTER
Verified name and .    Patient states that she did not go to the urgent care because she misunderstood the nurse that she spoke with earlier and thought that she was advised to not go.    This RN advised patient to go to the urgent care now and that follow up will be determined based on evaluation at urgent care.    She states that she will go to urgent care now.

## 2024-12-02 ENCOUNTER — NURSE TRIAGE (OUTPATIENT)
Dept: FAMILY MEDICINE CLINIC | Facility: CLINIC | Age: 59
End: 2024-12-02

## 2024-12-02 ENCOUNTER — OFFICE VISIT (OUTPATIENT)
Dept: FAMILY MEDICINE CLINIC | Facility: CLINIC | Age: 59
End: 2024-12-02

## 2024-12-02 ENCOUNTER — LAB ENCOUNTER (OUTPATIENT)
Dept: LAB | Age: 59
End: 2024-12-02
Attending: FAMILY MEDICINE
Payer: COMMERCIAL

## 2024-12-02 VITALS
HEART RATE: 83 BPM | WEIGHT: 134 LBS | TEMPERATURE: 98 F | BODY MASS INDEX: 23 KG/M2 | SYSTOLIC BLOOD PRESSURE: 114 MMHG | RESPIRATION RATE: 16 BRPM | DIASTOLIC BLOOD PRESSURE: 74 MMHG

## 2024-12-02 DIAGNOSIS — R10.32 LLQ PAIN: ICD-10-CM

## 2024-12-02 DIAGNOSIS — R10.32 LLQ PAIN: Primary | ICD-10-CM

## 2024-12-02 LAB
ALBUMIN SERPL-MCNC: 4.7 G/DL (ref 3.2–4.8)
ALBUMIN/GLOB SERPL: 1.9 {RATIO} (ref 1–2)
ALP LIVER SERPL-CCNC: 70 U/L
ALT SERPL-CCNC: 12 U/L
ANION GAP SERPL CALC-SCNC: 5 MMOL/L (ref 0–18)
APPEARANCE: CLEAR
AST SERPL-CCNC: 18 U/L (ref ?–34)
BILIRUB SERPL-MCNC: 0.4 MG/DL (ref 0.3–1.2)
BILIRUBIN: NEGATIVE
BUN BLD-MCNC: 15 MG/DL (ref 9–23)
BUN/CREAT SERPL: 19.7 (ref 10–20)
CALCIUM BLD-MCNC: 10.2 MG/DL (ref 8.7–10.4)
CHLORIDE SERPL-SCNC: 104 MMOL/L (ref 98–112)
CO2 SERPL-SCNC: 29 MMOL/L (ref 21–32)
CREAT BLD-MCNC: 0.76 MG/DL
DEPRECATED RDW RBC AUTO: 38.9 FL (ref 35.1–46.3)
EGFRCR SERPLBLD CKD-EPI 2021: 91 ML/MIN/1.73M2 (ref 60–?)
ERYTHROCYTE [DISTWIDTH] IN BLOOD BY AUTOMATED COUNT: 11.9 % (ref 11–15)
FASTING STATUS PATIENT QL REPORTED: NO
GLOBULIN PLAS-MCNC: 2.5 G/DL (ref 2–3.5)
GLUCOSE (URINE DIPSTICK): NEGATIVE MG/DL
GLUCOSE BLD-MCNC: 99 MG/DL (ref 70–99)
HCT VFR BLD AUTO: 37 %
HGB BLD-MCNC: 13 G/DL
KETONES (URINE DIPSTICK): NEGATIVE MG/DL
LEUKOCYTES: NEGATIVE
MCH RBC QN AUTO: 31.5 PG (ref 26–34)
MCHC RBC AUTO-ENTMCNC: 35.1 G/DL (ref 31–37)
MCV RBC AUTO: 89.6 FL
MULTISTIX LOT#: ABNORMAL NUMERIC
NITRITE, URINE: NEGATIVE
OSMOLALITY SERPL CALC.SUM OF ELEC: 287 MOSM/KG (ref 275–295)
PH, URINE: 7 (ref 4.5–8)
PLATELET # BLD AUTO: 374 10(3)UL (ref 150–450)
POTASSIUM SERPL-SCNC: 4.1 MMOL/L (ref 3.5–5.1)
PROT SERPL-MCNC: 7.2 G/DL (ref 5.7–8.2)
PROTEIN (URINE DIPSTICK): NEGATIVE MG/DL
RBC # BLD AUTO: 4.13 X10(6)UL
SODIUM SERPL-SCNC: 138 MMOL/L (ref 136–145)
SPECIFIC GRAVITY: 1.01 (ref 1–1.03)
UROBILINOGEN,SEMI-QN: 0.2 MG/DL (ref 0–1.9)
WBC # BLD AUTO: 6.6 X10(3) UL (ref 4–11)

## 2024-12-02 PROCEDURE — 3074F SYST BP LT 130 MM HG: CPT | Performed by: FAMILY MEDICINE

## 2024-12-02 PROCEDURE — 99214 OFFICE O/P EST MOD 30 MIN: CPT | Performed by: FAMILY MEDICINE

## 2024-12-02 PROCEDURE — 85027 COMPLETE CBC AUTOMATED: CPT

## 2024-12-02 PROCEDURE — 81003 URINALYSIS AUTO W/O SCOPE: CPT | Performed by: FAMILY MEDICINE

## 2024-12-02 PROCEDURE — 80053 COMPREHEN METABOLIC PANEL: CPT

## 2024-12-02 PROCEDURE — 3078F DIAST BP <80 MM HG: CPT | Performed by: FAMILY MEDICINE

## 2024-12-02 PROCEDURE — 36415 COLL VENOUS BLD VENIPUNCTURE: CPT

## 2024-12-02 NOTE — TELEPHONE ENCOUNTER
Called patient, confirmed name and .    Patient available for appointment today at 1:45.  Patient scheduled.

## 2024-12-02 NOTE — TELEPHONE ENCOUNTER
Action Requested: Summary for Provider     []  Critical Lab, Recommendations Needed  [] Need Additional Advice  []   FYI    []   Need Orders  [] Need Medications Sent to Pharmacy  [x]  Other     SUMMARY: Dr.Weiler can you add patient to your schedule for today.?She only wants to see you. If not today ok to add her on tomorrow? Please advise  Patient stated that her symptom started a months ago but she thought it was a muscle strain. She is having left lower abd pain by the groin area the pain was dull and on/off. Patient has noticed recently that when she has a bowel movement and she wipes her rectal area the pain becomes sharp on her lower abd left side. Then the pain becomes dull and is now constant. Patient denied having a fever, diarrhea or vomiting. No redness or swelling area the area and patient is able to walk. Dr.Weiler patient was inform she should be seen today for a evaluation.Patient agreed but she only wants to see you.Can you add her on today?     Reason for call: Abdominal Pain  Onset: 1 month      Reason for Disposition   Patient wants to be seen    Protocols used: Abdominal Pain - Female-A-[place]

## 2024-12-02 NOTE — PROGRESS NOTES
HPI: Steph is a 59 year old female who presents for LLQ pain.  Started in the left groin few months ago.  Has been intermittent until recently. Starting to radiate into genital region.  More constant.  After BM, she has very sharp pain in same spot when she wipes. Exercise does not make it worse or better. Feels tired, nauseated and decreased appetite. Normal bowel movements.  Has small amount of blood but not different from usual.     PMH:    Past Medical History:    Anxiety    Chronic rhinitis    Depression    DUB (dysfunctional uterine bleeding)    Genital herpes    Visual impairment      Alg:  Peanuts, [place], Garlic, and Eggs or egg-derived products   Meds: Medications Ordered Prior to Encounter[1]   Tobacco Use: no    ROS: see HPI    Objective:   Gen: AOx3. NAD.   /74   Pulse 83   Temp 97.7 °F (36.5 °C) (Temporal)   Resp 16   Wt 134 lb (60.8 kg)   LMP 03/20/2017   BMI 23.00 kg/m²   CV:  Regular rate and rhythm; no murmurs  Lungs:  Clear to ausculation; good aeration               No wheezes, rales or rhonchi  Abd: soft, mild tenderness noted to LLQ,  non-distended          Normal bowel sounds; no masses          No hepatosplenomegaly  MSK: normal left hip exam        Assessment:/Plan:  Encounter Diagnosis   Name Primary?    LLQ pain Yes       Has been intermittent for the past few months and has now increased in intensity and frequency.  Some tenderness noted on exam.  Has pain with wiping after BM.  Now has some nausea and other constitutional symptoms.  Will check bloodwork today.  CHeck CT abdomen and pelvis. Will call with results. Advised to proceed to the ER if symptoms worsen.     Colleen Weiler,            [1]   Current Outpatient Medications on File Prior to Visit   Medication Sig Dispense Refill    Collagen-Vitamin C-Biotin (COLLAGEN OR) Take by mouth.      Omega-3 Fatty Acids (FISH OIL OR) Take by mouth.      TURMERIC OR Take by mouth.      Folic Acid (FOLATE OR) Take by mouth.       Cyanocobalamin (B-12) 50 MCG Oral Tab Take by mouth.      Calcium 200 MG Oral Tab Take by mouth.      NON FORMULARY       Ergocalciferol (VITAMIN D OR) Take by mouth.       No current facility-administered medications on file prior to visit.

## 2024-12-09 ENCOUNTER — HOSPITAL ENCOUNTER (OUTPATIENT)
Dept: CT IMAGING | Facility: HOSPITAL | Age: 59
Discharge: HOME | End: 2024-12-09
Attending: FAMILY MEDICINE
Payer: COMMERCIAL

## 2024-12-09 DIAGNOSIS — R10.32 LLQ PAIN: ICD-10-CM

## 2024-12-09 PROCEDURE — 74177 CT ABD & PELVIS W/CONTRAST: CPT | Performed by: FAMILY MEDICINE

## 2024-12-11 ENCOUNTER — PATIENT MESSAGE (OUTPATIENT)
Dept: FAMILY MEDICINE CLINIC | Facility: CLINIC | Age: 59
End: 2024-12-11

## 2025-02-18 ENCOUNTER — OFFICE VISIT (OUTPATIENT)
Dept: FAMILY MEDICINE CLINIC | Facility: CLINIC | Age: 60
End: 2025-02-18

## 2025-02-18 ENCOUNTER — LAB ENCOUNTER (OUTPATIENT)
Dept: LAB | Age: 60
End: 2025-02-18
Attending: FAMILY MEDICINE
Payer: COMMERCIAL

## 2025-02-18 VITALS
HEIGHT: 65 IN | HEART RATE: 75 BPM | TEMPERATURE: 98 F | OXYGEN SATURATION: 99 % | DIASTOLIC BLOOD PRESSURE: 66 MMHG | BODY MASS INDEX: 23.16 KG/M2 | SYSTOLIC BLOOD PRESSURE: 120 MMHG | WEIGHT: 139 LBS

## 2025-02-18 DIAGNOSIS — R10.32 LLQ PAIN: ICD-10-CM

## 2025-02-18 DIAGNOSIS — Z12.31 SCREENING MAMMOGRAM FOR BREAST CANCER: Primary | ICD-10-CM

## 2025-02-18 DIAGNOSIS — N89.8 VAGINAL IRRITATION: ICD-10-CM

## 2025-02-18 DIAGNOSIS — Z00.00 ROUTINE PHYSICAL EXAMINATION: ICD-10-CM

## 2025-02-18 DIAGNOSIS — R30.0 DYSURIA: ICD-10-CM

## 2025-02-18 LAB
APPEARANCE: CLEAR
BILIRUBIN: NEGATIVE
CHOLEST SERPL-MCNC: 172 MG/DL (ref ?–200)
EST. AVERAGE GLUCOSE BLD GHB EST-MCNC: 114 MG/DL (ref 68–126)
FASTING PATIENT LIPID ANSWER: YES
GLUCOSE (URINE DIPSTICK): NEGATIVE MG/DL
HBA1C MFR BLD: 5.6 % (ref ?–5.7)
HDLC SERPL-MCNC: 58 MG/DL (ref 40–59)
KETONES (URINE DIPSTICK): NEGATIVE MG/DL
LDLC SERPL CALC-MCNC: 102 MG/DL (ref ?–100)
LEUKOCYTES: NEGATIVE
MULTISTIX LOT#: NORMAL NUMERIC
NITRITE, URINE: NEGATIVE
NONHDLC SERPL-MCNC: 114 MG/DL (ref ?–130)
OCCULT BLOOD: NEGATIVE
PH, URINE: 7 (ref 4.5–8)
PROTEIN (URINE DIPSTICK): NEGATIVE MG/DL
SPECIFIC GRAVITY: 1.01 (ref 1–1.03)
TRIGL SERPL-MCNC: 60 MG/DL (ref 30–149)
TSI SER-ACNC: 1.79 UIU/ML (ref 0.55–4.78)
URINE-COLOR: YELLOW
UROBILINOGEN,SEMI-QN: 0.2 MG/DL (ref 0–1.9)
VLDLC SERPL CALC-MCNC: 10 MG/DL (ref 0–30)

## 2025-02-18 PROCEDURE — 84443 ASSAY THYROID STIM HORMONE: CPT

## 2025-02-18 PROCEDURE — 83036 HEMOGLOBIN GLYCOSYLATED A1C: CPT

## 2025-02-18 PROCEDURE — 87086 URINE CULTURE/COLONY COUNT: CPT | Performed by: FAMILY MEDICINE

## 2025-02-18 PROCEDURE — 80061 LIPID PANEL: CPT

## 2025-02-18 PROCEDURE — 36415 COLL VENOUS BLD VENIPUNCTURE: CPT

## 2025-02-18 RX ORDER — ESTRADIOL 10 UG/1
TABLET, FILM COATED VAGINAL
Qty: 18 TABLET | Refills: 1 | Status: SHIPPED | OUTPATIENT
Start: 2025-02-18 | End: 2025-03-18

## 2025-02-18 NOTE — PROGRESS NOTES
HPI:  60 yr old female who presents for physical.  with 2 kids. Retired teacher. Exercises regularly. Plays pickleball. Weight lifts.    Pt had recent herpes outbreak.  Resolved and then few days later, it was uncomfortable to urinate.  Feels tender to wipe as well. No vaginal bleeding. [place] is very painful.     Due for mammogram.     Due for colonoscopy in 2027.     Had LLQ abdominal pain.  Still feels painful tugging in LLQ when she wipes. Having regular BMs.  No straining. Has some pain in LLQ when she weight lifts.     PMHx: reviewed, see chart  PSHx: reviewed, see chart  All: Peanuts, [place], Garlic, and Eggs or egg-derived products   Meds: see chart    ROS:   Allergic/Immuno:  Negative for environmental allergies and food allergies  Cardiovascular:  Negative for chest pain and irregular heartbeat/palpitations  Constitutional:  Negative for decreased activity, fever, irritability and lethargy  Endocrine:  Negative for abnormal sleep patterns, increased activity, polydipsia and polyphagia  ENMT:  Negative for ear drainage, hearing loss and nasal drainage  Eyes:  Negative for eye discharge and vision loss  Gastrointestinal:  Negative for abdominal pain, constipation, decreased appetite, diarrhea and vomiting  Genitourinary:  Negative for dysuria and hematuria  Hema/Lymph:  Negative for easy bleeding and easy bruising  Integumentary:  Negative for pruritus and rash  Musculoskeletal:  Negative for bone/joint symptoms  Neurological:  Negative for gait disturbance  Psychiatric:  Negative for inappropriate interaction and psychiatric symptoms    PE:  /66 (BP Location: Right arm, Patient Position: Sitting, Cuff Size: adult)   Pulse 75   Temp 97.6 °F (36.4 °C) (Temporal)   Ht 5' 5\" (1.651 m)   Wt 139 lb (63 kg)   LMP 03/20/2017   SpO2 99%   BMI 23.13 kg/m²   Gen:  Well-nourished.  No distress.  HEENT: Conjunctive clear.  [place] ear canals clear.  [place] TMs intact with good landmarks noted.  Nares  patent.  Oral mucous membrane moist.  Normal lips, teeth, and gums.  Oropharynx normal.  Neck supple.  Good ROM.  No LAD.  Thyroid normal.  CV:  Regular rate and rhythm; no murmurs  Lungs:  Clear to ausculation; good aeration               No wheezes, rales or rhonchi  Abd: soft, non-tender, non-distended          Normal bowel sounds; no masses          No hepatosplenomegaly  Breasts:  Normal appearance bilateral.  No masses or lesions noted.  Normal nipples bilateral.  No nipple discharge noted.  Normal lymph nodes.  : dry vaginal mucosa noted. No vaginal sores noted  Extremities: No cyanosis, clubbing, edema.  Pedal pulses 2+ nicolas.  MSK:  No abnormalities.  Skin: Warm/dry/intact.  No abnormal moles/lesions noted.  No rashes.    A/P:  Encounter Diagnoses   Name Primary?    Routine physical examination    Healthy.  Active.  Labs done.  TdaP given.  Will go to [facility] for flu shot and PCV20.        Screening mammogram for breast cancer     Mammogram ordered.  Will call with results.   Yes    Dysuria    Urine dip negative. Will send urine culture.        Vaginal irritation    Chronic.  [place] is painful.  Will try Vaginal estrogen.        LLQ pain    Unclear etiology.  Will see how she feels on a few months of vaginal estrogen. If no improvement, refer to Urogynecology.         Colleen Weiler, DO

## 2025-03-03 NOTE — PROGRESS NOTES
HPI:    Patient ID: Cielo Lara is a 47year old female. HPI  Pleasant 21-year-old female presents as a new patient to me on consult from [address] [facility]. Patient states that she has painful bunions and wants to know options of care.   She is aware of a pos there are no plantar calluses. Passive motion of the first MPJ is without restriction and there is no palpable crepitation. X-ray today confirms the diagnosis. There is an obvious increase in the intermetatarsal and hallux abductus angles.   I diagrammed 137

## 2025-03-05 ENCOUNTER — PATIENT MESSAGE (OUTPATIENT)
Dept: FAMILY MEDICINE CLINIC | Facility: CLINIC | Age: 60
End: 2025-03-05

## 2025-03-06 NOTE — TELEPHONE ENCOUNTER
Dr.Weiler please see patient Mychart message below. Thank you   Please respond directly to the patient if no additional staff support is required.

## 2025-03-11 RX ORDER — ESTRADIOL 10 UG/1
10 TABLET, FILM COATED VAGINAL
Qty: 24 TABLET | Refills: 3 | Status: SHIPPED | OUTPATIENT
Start: 2025-03-11 | End: 2025-06-09

## 2025-03-20 ENCOUNTER — HOSPITAL ENCOUNTER (OUTPATIENT)
Dept: MAMMOGRAPHY | Age: 60
Discharge: HOME | End: 2025-03-20
Attending: FAMILY MEDICINE
Payer: COMMERCIAL

## 2025-03-20 DIAGNOSIS — Z12.31 SCREENING MAMMOGRAM FOR BREAST CANCER: ICD-10-CM

## 2025-03-20 PROCEDURE — 77067 SCR MAMMO BI INCL CAD: CPT | Performed by: FAMILY MEDICINE

## 2025-03-20 PROCEDURE — 77063 BREAST TOMOSYNTHESIS BI: CPT | Performed by: FAMILY MEDICINE

## 2025-03-24 NOTE — PROGRESS NOTES
ID: Steph Aguilar  : 1965  Date: 3/25/2025     Referred by Dr. Colleen Weiler, DO    Chief Complaint   Patient presents with    Other     LLQ pain & dyspareunia X 2 months        HPI:  60 year old female, G2,  x1, who presents for evaluation of left lower quadrant pain x 3 months.    Had CT of abdomen and pelvis on 24 showing no acute findings, but moderate to severe constipation.  Also reports dyspareunia which is longstanding.   Feels pain irradiates to her groin and vaginal area.   No prolapse  No GLORIA  Some UUI with a full bladder.   No history of recurrent UTIs. Had negative urine culture on 25  Bowels are regular.  Has a uterus.  Menopausal.  Has bothersome menopausal symptoms.   Has been on Vagifem for several years.    PMHx: Anxiety, depression, HSV.      Urogynecology Summary:  Urogynecology Summary  Prolapse: No  GLORIA: No  Urge Incontinence: Yes  Nocturia Frequency: 1 (Reports 1 to 2 times)  Frequency: 2 - 3 hours  Incomplete emptying: Yes (Reports shifting positions to void)  Constipation: No (Bowel regimen reviewed. Handout given.)  Wears pad day?: 0  Wears Pad Night?: 0  Activities are limited by UI/POP?: No  Currently Sexually Active: Yes (Reports dyspareunia. Denies leakage and prolapse.)          HISTORY:  Past Medical History:    Anxiety    Chronic rhinitis    Depression    DUB (dysfunctional uterine bleeding)    Genital herpes    Visual impairment      Past Surgical History:   Procedure Laterality Date    Appendectomy      Appendectomy             delivery following neuraxial labor analgesia/a      Colonoscopy N/A 2020    Procedure: COLONOSCOPY;  Surgeon: Donavan Ponce MD;  Location: [facility] ENDOSCOPY    Hemorrhoidectomy  2023      Family History   Problem Relation Age of Onset    Other (Other) Mother     Diabetes Mother     Hypertension Mother     Cancer Father         per [employer]:  \"Cancer - esophageal\"    Diabetes Father           at age 56    Other (Other) Sister         sarcoidosis    Cancer Paternal Grandmother         per [employer]:  \"Cancer - ovarian, age 60 (cause of death)\"    Ovarian Cancer Paternal Grandmother 60    Cancer Maternal Aunt         per [employer]:  \"Cancer - breast, (cause of death)\"    Breast Cancer Maternal Aunt 43        cause of death    Breast Cancer Maternal Cousin Female 50    Depression Other         per [employer]:  \"Family h/o Depression\"    Hypertension Other         per [employer]:  \"Family h/o Hypertension\"      Social History     Socioeconomic History    Marital status:    Tobacco Use    Smoking status: Never    Smokeless tobacco: Never   Vaping Use    Vaping status: Never Used   Substance and Sexual Activity    Alcohol use: Yes     Comment: (beer & wine) occasionally    Drug use: Yes     Types: Cannabis   Other Topics Concern    Caffeine Concern Yes     Comment: Coffee, 2 cups daily    Exercise Yes     Comment: 4 x weekly - swimming, running, biking    Pt has a pacemaker No    Pt has a defibrillator No    Reaction to local anesthetic No   Social History Narrative    The patient does not use an assistive device..      The patient does live in a home with stairs.     Social Drivers of Health     Food Insecurity: No Food Insecurity (2025)    NCSS - Food Insecurity     Worried About Running Out of Food in the Last Year: No     Ran Out of Food in the Last Year: No   Transportation Needs: No Transportation Needs (2025)    NCSS - Transportation     Lack of Transportation: No   Housing Stability: Not At Risk (2025)    NCSS - Housing/Utilities     Has Housing: Yes     Worried About Losing Housing: No     Unable to Get Utilities: No        Allergies:  Allergies[1]    Medications:  Medications Prior to Visit[2]    Review of Systems:    A comprehensive 12 point review of systems was completed.  Pertinent positives noted in the the HPI.  Denies CP  Denies SOB    Vitals:  Resp 18   Ht 65\"   Wt 135 lb  (61.2 kg)   LMP 03/20/2017   BMI 22.47 kg/m²        GENERAL EXAM:  GENERAL:  Alert and oriented. Well-nourished, normally developed.  Thought and emotional status are appropriate, speech is understandable.  No acute distress.   HEAD: Normocephalic and atraumatic with normal hair distribution  LUNGS:  Normal respiratory effort.    ABDOMEN: Non tender to palpation, tone normal without rigidity or guarding, no masses present, no evidence of hernia. Pfannenstiel scar noted.   EXTREMITIES:  Without edema, varicosities or lesions.   SKIN:  Warm and dry, with good color and turgor. No lesions.    PELVIC EXAM: The patient consented verbally to the pelvic exam and procedures as needed. The chaperone present and witnessing exam was MARY Alonso  External Genitalia: Normal appearance for age. + atrophy, no lesions  Urethra: + atrophy, non tender  Bladder:no fullness, non tender  Vagina: + atrophy, + diffuse vaginal tenderness   Cervix: no bleeding, no lesions, non tender  Uterus: soft, mobile, non tender  Adnexa:no masses, non tender  Perineum: non tender  Anus: no hemorrhoids  Rectum: deferred.     PELVIS FLOOR NEUROMUSCULAR FUNCTION:  Strength:  Increased tone and Tender  Perineal Sensation:  Normal      PELVIC SUPPORT:  [place]:  0  Ant:  0  Post:  0  CST:  negative  UVJ: not hypermobile    Impression/Plan:    ICD-10-CM    1. Dyspareunia in female  N94.10 PHYSICAL THERAPY - External Location      2. Pelvic floor tension  M62.89       3. Vaginal atrophy  N95.2       4. Menopausal symptoms  N95.1           Discussion Items:   Pelvic muscle rehabilitation including pelvic floor PT  Topical estrogen therapy for treating UGA  Discussed dietary and behavioral modification, discussed pharmacologic and nonpharmacologic mgmt options for urinary symptoms. Discussed dietary & weight management with potential improvements in symptoms with weight loss.    Diagnostic Items:  None    Medications Discussed:  Continue Vagifem x 2 per week.      Treatment Plan, Non-surgical:   Pelvic floor PT. Referral provided.      Treatment Plan, Surgical:   None    Pt verbalizes understanding of all above discussed information. Recommend that she follows up with general Gynecologist to discuss menopausal symptoms.    Follow up in 4 months or sooner prn. OK to see LEILANI Velazquez MD, FACOG, FACS  Urogynecology and Reconstructive Pelvic Surgery            [1]   Allergies  Allergen Reactions    Peanuts UNKNOWN     NUTS    [place] NAUSEA ONLY    Garlic NAUSEA ONLY    Eggs Or Egg-Derived Products NAUSEA ONLY   [2]   Outpatient Medications Prior to Visit   Medication Sig Dispense Refill    Estradiol (VAGIFEM) 10 MCG Vaginal Tab Place 10 mcg vaginally twice a week. 24 tablet 3    Collagen-Vitamin C-Biotin (COLLAGEN OR) Take by mouth.      Omega-3 Fatty Acids (FISH OIL OR) Take by mouth.      TURMERIC OR Take by mouth.      Folic Acid (FOLATE OR) Take by mouth.      Cyanocobalamin (B-12) 50 MCG Oral Tab Take by mouth.      Calcium 200 MG Oral Tab Take by mouth.      NON FORMULARY       Ergocalciferol (VITAMIN D OR) Take by mouth.       No facility-administered medications prior to visit.

## 2025-03-25 ENCOUNTER — OFFICE VISIT (OUTPATIENT)
Dept: UROLOGY | Facility: HOSPITAL | Age: 60
End: 2025-03-25
Attending: OBSTETRICS & GYNECOLOGY
Payer: COMMERCIAL

## 2025-03-25 VITALS — RESPIRATION RATE: 18 BRPM | WEIGHT: 135 LBS | BODY MASS INDEX: 22.49 KG/M2 | HEIGHT: 65 IN

## 2025-03-25 DIAGNOSIS — M62.89 PELVIC FLOOR TENSION: ICD-10-CM

## 2025-03-25 DIAGNOSIS — N94.10 DYSPAREUNIA IN FEMALE: Primary | ICD-10-CM

## 2025-03-25 DIAGNOSIS — N95.2 VAGINAL ATROPHY: ICD-10-CM

## 2025-03-25 DIAGNOSIS — N95.1 MENOPAUSAL SYMPTOMS: ICD-10-CM

## 2025-03-25 PROCEDURE — 99202 OFFICE O/P NEW SF 15 MIN: CPT

## 2025-07-07 ENCOUNTER — TELEPHONE (OUTPATIENT)
Dept: FAMILY MEDICINE CLINIC | Facility: CLINIC | Age: 60
End: 2025-07-07

## 2025-07-07 NOTE — TELEPHONE ENCOUNTER
Patient has ongoing stomach pain and is asking if there is a medication that can be prescribed for her?  Please call patient.

## 2025-07-07 NOTE — TELEPHONE ENCOUNTER
Dr Weiler,    Condition update: Advise if 7-9-25 Res slot at 3:30 pm can be used. Thank you    Called and spoke with patient and identified full name and date of birth.  Stated she is still having intermittent, shooting,lower abdominal pain, but it feels different from when she   was diagnosed with \"severe constipation \"recently  No other symptoms.  Is having \"normal daily bowel movements\"  Is eating fiber as advised   Wants appointment with you  Advised go to UC/ED if symptoms worsen and will do so

## 2025-07-08 NOTE — TELEPHONE ENCOUNTER
Advised patient of Dr. Weiler's note. Patient verbalized understanding. Appointment made.    Future Appointments   Date Time Provider Department Center   7/9/2025  3:30 PM Weiler, Colleen M, DO [facility]

## 2025-07-09 ENCOUNTER — OFFICE VISIT (OUTPATIENT)
Dept: FAMILY MEDICINE CLINIC | Facility: CLINIC | Age: 60
End: 2025-07-09

## 2025-07-09 VITALS
HEIGHT: 65 IN | SYSTOLIC BLOOD PRESSURE: 119 MMHG | TEMPERATURE: 98 F | BODY MASS INDEX: 22.82 KG/M2 | HEART RATE: 72 BPM | WEIGHT: 137 LBS | OXYGEN SATURATION: 98 % | RESPIRATION RATE: 16 BRPM | DIASTOLIC BLOOD PRESSURE: 82 MMHG

## 2025-07-09 DIAGNOSIS — R10.32 LLQ PAIN: Primary | ICD-10-CM

## 2025-07-09 PROCEDURE — G2211 COMPLEX E/M VISIT ADD ON: HCPCS | Performed by: FAMILY MEDICINE

## 2025-07-09 PROCEDURE — 3008F BODY MASS INDEX DOCD: CPT | Performed by: FAMILY MEDICINE

## 2025-07-09 PROCEDURE — 3074F SYST BP LT 130 MM HG: CPT | Performed by: FAMILY MEDICINE

## 2025-07-09 PROCEDURE — 3079F DIAST BP 80-89 MM HG: CPT | Performed by: FAMILY MEDICINE

## 2025-07-09 PROCEDURE — 99213 OFFICE O/P EST LOW 20 MIN: CPT | Performed by: FAMILY MEDICINE

## 2025-07-09 RX ORDER — ESTRADIOL 0.25 MG/.25G
GEL TOPICAL
COMMUNITY
Start: 2025-05-29

## 2025-07-09 RX ORDER — ESTRADIOL 10 UG/1
INSERT VAGINAL
COMMUNITY
Start: 2025-06-09

## 2025-07-09 RX ORDER — PROGESTERONE 100 MG/1
100 CAPSULE ORAL DAILY
COMMUNITY
Start: 2025-04-22

## 2025-07-09 NOTE — PROGRESS NOTES
The following individual(s) verbally consented to be recorded using ambient AI listening technology and understand that they can each withdraw their consent to this listening technology at any point by asking the clinician to turn off or pause the recording:    Patient name: Steph Salcedo Jeff  Additional names:

## 2025-07-09 NOTE — PROGRESS NOTES
Subjective:   Steph Aguilar is a 60 year old female who presents for Abdominal Pain (Pt reports sharp pain in l/t inguinal area for some weeks.  )       History/Other:   History of Present Illness  Steph Aguilar is a 60 year old female who presents with intermittent sharp left lower abdominal pain.    She has been experiencing intermittent sharp pain in the left lower abdomen for the past three weeks. The pain is described as 'really deep inside' and occurs every few days, lasting for a few minutes before subsiding after sitting down. The most recent episode, two to three days ago, was more severe than previous ones. No associated symptoms such as nausea, changes in appetite, urinary issues, or changes in bowel habits. Bowel movements are regular, and she maintains a diet high in vegetables. No vaginal bleeding reported.    She mentions a recent herpes outbreak, which she experiences occasionally, but does not believe it is related to her abdominal pain. The outbreak started after the abdominal pain began.    She recalls being told after a CT scan in December that she had an abdominal hernia.  She engages in weightlifting regularly and has not experienced any strain or bulge related to her activities. She also recalls a previous episode of pain in February, which resolved after increasing her fiber intake. Additionally, she mentions a sensation of bruising at the bottom of her vaginal area, which persists intermittently.     Chief Complaint Reviewed and Verified  Nursing Notes Reviewed and   Verified  Tobacco Reviewed  Allergies Reviewed  Medications Reviewed    OB Status Reviewed         Tobacco:  She has never smoked tobacco.    Current Medications[1]      Review of Systems:  See HPI      Objective:   /82   Pulse 72   Temp 97.7 °F (36.5 °C) (Oral)   Resp 16   Ht 5' 5\" (1.651 m)   Wt 137 lb (62.1 kg)   LMP 03/20/2017   SpO2 98%   BMI 22.80 kg/m²  Estimated body mass index is 22.8 kg/m² as  calculated from the following:    Height as of this encounter: 5' 5\" (1.651 m).    Weight as of this encounter: 137 lb (62.1 kg).  Results  RADIOLOGY  Abdominal CT: Abdominal hernia (12/2024)     Physical Exam  CV:  Regular rate and rhythm; no murmurs  Lungs:  Clear to ausculation; good aeration               No wheezes, rales or rhonchi  Abd: soft, tenderness noted to LLQ, non-distended          Normal bowel sounds; no masses              Assessment & Plan:   1. LLQ pain (Primary)  -     US PELVIS (TRANSABDOMINAL AND TRANSVAGINAL) (CPT=76856/95461); Future; Expected date: 07/09/2025    Assessment & Plan  Left lower abdominal pain  Intermittent sharp pain in the left lower abdomen for approximately three weeks, occurring every few days, lasting a few minutes, and relieved by sitting. No associated nausea, changes in bowel habits, or urinary issues. Differential diagnosis includes a larger ovarian cyst or other gynecological issues. Previous CT scan in December was normal, except for an unrelated abdominal hernia on the opposite side. Plan includes ordering an ultrasound to evaluate potential gynecological causes.  - Order abdominal and transvaginal ultrasound to evaluate uterus, ovaries, and fallopian tubes.  - Advise her to report any worsening or increased frequency of symptoms.      Herpes simplex virus outbreak  Recent severe herpes outbreak, now resolving. Occurs infrequently and does not seem to be related to the abdominal pain.    Follow-up  Concern about pain recurring during an upcoming vacation in two weeks.  - Advise her to contact the clinic if symptoms worsen or become more frequent before the vacation.        No follow-ups on file.        Colleen Weiler, DO, 7/9/2025, 3:53 PM           [1]   Current Outpatient Medications   Medication Sig Dispense Refill    progesterone 100 MG Oral Cap Take 1 capsule (100 mg total) by mouth daily.      VAGIFEM 10 MCG Vaginal Tab Place vaginally twice a week.       Estradiol 0.25 MG/0.25GM Transdermal Gel Apply half to 1 packet daily      ASHWAGANDHA OR Take by mouth.      Collagen-Vitamin C-Biotin (COLLAGEN OR) Take by mouth.      Omega-3 Fatty Acids (FISH OIL OR) Take by mouth.      TURMERIC OR Take by mouth.      Folic Acid (FOLATE OR) Take by mouth.      Cyanocobalamin (B-12) 50 MCG Oral Tab Take by mouth.      Calcium 200 MG Oral Tab Take by mouth.      Ergocalciferol (VITAMIN D OR) Take by mouth.      NON FORMULARY

## 2025-08-29 ENCOUNTER — HOSPITAL ENCOUNTER (OUTPATIENT)
Dept: ULTRASOUND IMAGING | Facility: HOSPITAL | Age: 60
Discharge: HOME | End: 2025-08-29
Attending: FAMILY MEDICINE

## 2025-08-29 DIAGNOSIS — R10.32 LLQ PAIN: ICD-10-CM

## 2025-08-29 PROCEDURE — 76830 TRANSVAGINAL US NON-OB: CPT | Performed by: FAMILY MEDICINE

## 2025-08-29 PROCEDURE — 76856 US EXAM PELVIC COMPLETE: CPT | Performed by: FAMILY MEDICINE

## (undated) DIAGNOSIS — H43.393 VITREOUS FLOATERS OF BOTH EYES: Primary | ICD-10-CM

## (undated) DEVICE — GAMMEX® PI HYBRID SIZE 7, STERILE POWDER-FREE SURGICAL GLOVE, POLYISOPRENE AND NEOPRENE BLEND: Brand: GAMMEX

## (undated) DEVICE — MINOR GENERAL: Brand: MEDLINE INDUSTRIES, INC.

## (undated) DEVICE — MEGADYNE EZ CLEAN BLADE 2.75IN

## (undated) DEVICE — 35 ML SYRINGE REGULAR TIP: Brand: MONOJECT

## (undated) DEVICE — SNARE OPTMZ PLPCTM TRP

## (undated) DEVICE — Device: Brand: CUSTOM PROCEDURE KIT

## (undated) DEVICE — HARMONIC FOCUS SHEARS 9CM LENGTH + ADAPTIVE TISSUE TECHNOLOGY FOR USE WITH BLUE HAND PIECE ONLY: Brand: HARMONIC FOCUS

## (undated) DEVICE — LINE MNTR ADLT SET O2 INTMD

## (undated) DEVICE — PAD ALC 43.5X24IN PREP  LF

## (undated) DEVICE — SKIN PREP TRAY 4 COMPARTM TRAY: Brand: MEDLINE INDUSTRIES, INC.

## (undated) DEVICE — MEDI-VAC NON-CONDUCTIVE SUCTION TUBING 6MM X 1.8M (6FT.) L: Brand: CARDINAL HEALTH

## (undated) DEVICE — Device: Brand: DEFENDO AIR/WATER/SUCTION AND BIOPSY VALVE

## (undated) DEVICE — STIRRUP STRAP W/SLING RING

## (undated) DEVICE — SOL NACL IRRIG 0.9% 1000ML BTL

## (undated) DEVICE — FORCEP RADIAL JAW 4

## (undated) DEVICE — SUT CHROMIC GUT 2-0 SH G123H

## (undated) DEVICE — SNARE CAPTIFLEX MICRO-OVL OLY

## (undated) NOTE — MR AVS SNAPSHOT
[facility]  [address], [address]  [phone]               Thank you for choosing us for your health care visit with Rosa Elena Simon MD.  We are glad to serve you and happy to provide you with this summary between 7:30am to 6pm and on Saturday between 8am and 1pm. Evening and weekend appointments for your exam are available. [facility] ([address]) [address] [facility] [address]  [facility] SHMUEL Take 1 tablet (500 mg total) by mouth 2 (two) times daily. For 3 days at onset of symptoms   What changed:  See the new instructions.    Commonly known as:  VALTREX                Where to Get Your Medications      These medications were sent to [address] #

## (undated) NOTE — LETTER
May 1, 2018         Bob Arellanoolas      Patient: Tory Gonzales   YOB: 1965   Date of Visit: 5/1/2018       Dear Dr. Phil Wilder,    I saw your patient, Tory Gonzales, on 5/1/2018.  En

## (undated) NOTE — LETTER
AUTHORIZATION FOR SURGICAL OPERATION OR OTHER PROCEDURE    1.  I hereby authorize Dr. Delano Boyd, and [address] [place] [address] staff assigned to my case to perform the following operation and/or procedure at the [address] [place] [address]:    _________________________ Patient Name:  ______________________________________________________  (please print)      Patient signature:  ___________________________________________________             Relationship to Patient:           []  Parent    Responsible person

## (undated) NOTE — LETTER
To Whom It May Concern:    Michael Brar has been under our care regarding ongoing medical issues. Because of this, she has been required to restrict her physical activities. She may resume her usual activities, including work, on Monday, March 6, 2023 with the following restrictions:    []  None     [x]    No heavy lifting (over 15 pounds) for      2         weeks   []    Part-time (no more than             hours per week) for               week   []  Other:        Please feel free to contact us if there are any questions.       Sincerely,        Evie Connors MD  [facility], [address]  [phone]        Document generated by:  Evie Connors MD

## (undated) NOTE — LETTER
3/3/2023          To Whom It May Concern:    Shahab Herman is currently under my medical care and may not return to {em school/work:[id number]} at this time. Please excuse Danna Aldana for {NUMBERS 0-10:3282} {days weeks:3323::\"days\"}. {HE/SHE :6250} may return to {em school/work:574413960} on ***. Activity is restricted as follows: {EM SCHOOL/WORK RESTRICTIONS:968606973}. If you require additional information please contact our office.         Sincerely,    Alyssa Beckman MD          Document generated by:  Suha Ortiz

## (undated) NOTE — LETTER
3/3/2023          To Whom It May Concern:    Shahab Herman is currently under my medical care. Danna Aldana had surgery on 2-23-23. She was seen in our office for a post op evaluation on 3-1-23. The patient requires additional time off work for medical leave for recovery. Please excuses her from any and all work for two weeks following the surgery. Danna Aldana may return to work on 3-9-23. If you require additional information please contact our office.         Sincerely,          Alyssa Beckman MD          Document generated by:  Suha Ortiz

## (undated) NOTE — LETTER
6/26/2023              Fabio Aguilar        [address]         To Whom it may concern:    Fady Brown has had tuberculin purified protein derivative (PPD) tests as listed below. INDURATION (PPD)   Date Value Ref Range Status   06/24/2023 0.0 0.0 - 11 mm Final     Comment:     NEGATIVE       Should you have any further questions or require additional information please contact our office.           Sincerely,    Pretty Meneses,   [facility], [address] Andree Coronel [address] [place] [id number] [phone]        Document electronically generated by:  Getachew Staton

## (undated) NOTE — LETTER
10/8/2020              [id number]         To whom it may concern,      Suzzanne Leventhal is a patient of our clinic and has been treated for depression and anxiety for years.   Her symptoms have been exacerbat